# Patient Record
Sex: FEMALE | Race: WHITE | NOT HISPANIC OR LATINO | Employment: FULL TIME
[De-identification: names, ages, dates, MRNs, and addresses within clinical notes are randomized per-mention and may not be internally consistent; named-entity substitution may affect disease eponyms.]

---

## 2019-06-23 ENCOUNTER — RECORDS - HEALTHEAST (OUTPATIENT)
Dept: ADMINISTRATIVE | Facility: OTHER | Age: 49
End: 2019-06-23

## 2021-05-25 ENCOUNTER — RECORDS - HEALTHEAST (OUTPATIENT)
Dept: ADMINISTRATIVE | Facility: CLINIC | Age: 51
End: 2021-05-25

## 2021-06-16 PROBLEM — K59.00 CONSTIPATION: Status: ACTIVE | Noted: 2019-06-24

## 2023-06-10 ENCOUNTER — HOSPITAL ENCOUNTER (OUTPATIENT)
Facility: CLINIC | Age: 53
Setting detail: OBSERVATION
Discharge: HOME | End: 2023-06-13
Attending: EMERGENCY MEDICINE | Admitting: EMERGENCY MEDICINE
Payer: COMMERCIAL

## 2023-06-10 ENCOUNTER — APPOINTMENT (OUTPATIENT)
Dept: CT IMAGING | Facility: CLINIC | Age: 53
End: 2023-06-10
Attending: EMERGENCY MEDICINE
Payer: COMMERCIAL

## 2023-06-10 DIAGNOSIS — K59.00 CONSTIPATION, UNSPECIFIED CONSTIPATION TYPE: ICD-10-CM

## 2023-06-10 DIAGNOSIS — K52.9 COLITIS: ICD-10-CM

## 2023-06-10 LAB
ALBUMIN SERPL-MCNC: 3.9 G/DL (ref 3.5–5)
ALBUMIN UR-MCNC: NEGATIVE MG/DL
ALP SERPL-CCNC: 64 U/L (ref 45–120)
ALT SERPL W P-5'-P-CCNC: 16 U/L (ref 0–45)
ANION GAP SERPL CALCULATED.3IONS-SCNC: 8 MMOL/L (ref 5–18)
APPEARANCE UR: CLEAR
AST SERPL W P-5'-P-CCNC: 18 U/L (ref 0–40)
BASOPHILS # BLD AUTO: 0.1 10E3/UL (ref 0–0.2)
BASOPHILS NFR BLD AUTO: 1 %
BILIRUB SERPL-MCNC: 1 MG/DL (ref 0–1)
BILIRUB UR QL STRIP: NEGATIVE
BUN SERPL-MCNC: 12 MG/DL (ref 8–22)
CALCIUM SERPL-MCNC: 8.9 MG/DL (ref 8.5–10.5)
CHLORIDE BLD-SCNC: 102 MMOL/L (ref 98–107)
CO2 SERPL-SCNC: 27 MMOL/L (ref 22–31)
COLOR UR AUTO: ABNORMAL
CREAT SERPL-MCNC: 0.85 MG/DL (ref 0.6–1.1)
EOSINOPHIL # BLD AUTO: 0.1 10E3/UL (ref 0–0.7)
EOSINOPHIL NFR BLD AUTO: 1 %
ERYTHROCYTE [DISTWIDTH] IN BLOOD BY AUTOMATED COUNT: 12.2 % (ref 10–15)
GFR SERPL CREATININE-BSD FRML MDRD: 81 ML/MIN/1.73M2
GLUCOSE BLD-MCNC: 97 MG/DL (ref 70–125)
GLUCOSE UR STRIP-MCNC: NEGATIVE MG/DL
HCG SERPL QL: NEGATIVE
HCT VFR BLD AUTO: 40.6 % (ref 35–47)
HGB BLD-MCNC: 14 G/DL (ref 11.7–15.7)
HGB UR QL STRIP: NEGATIVE
IMM GRANULOCYTES # BLD: 0 10E3/UL
IMM GRANULOCYTES NFR BLD: 0 %
KETONES UR STRIP-MCNC: 40 MG/DL
LACTATE SERPL-SCNC: 0.7 MMOL/L (ref 0.7–2)
LEUKOCYTE ESTERASE UR QL STRIP: ABNORMAL
LIPASE SERPL-CCNC: 18 U/L (ref 0–52)
LYMPHOCYTES # BLD AUTO: 1.4 10E3/UL (ref 0.8–5.3)
LYMPHOCYTES NFR BLD AUTO: 13 %
MCH RBC QN AUTO: 33.3 PG (ref 26.5–33)
MCHC RBC AUTO-ENTMCNC: 34.5 G/DL (ref 31.5–36.5)
MCV RBC AUTO: 97 FL (ref 78–100)
MONOCYTES # BLD AUTO: 0.7 10E3/UL (ref 0–1.3)
MONOCYTES NFR BLD AUTO: 7 %
MUCOUS THREADS #/AREA URNS LPF: PRESENT /LPF
NEUTROPHILS # BLD AUTO: 8.3 10E3/UL (ref 1.6–8.3)
NEUTROPHILS NFR BLD AUTO: 78 %
NITRATE UR QL: NEGATIVE
NRBC # BLD AUTO: 0 10E3/UL
NRBC BLD AUTO-RTO: 0 /100
PH UR STRIP: 6.5 [PH] (ref 5–7)
PLATELET # BLD AUTO: 213 10E3/UL (ref 150–450)
POTASSIUM BLD-SCNC: 4.3 MMOL/L (ref 3.5–5)
PROT SERPL-MCNC: 6.6 G/DL (ref 6–8)
RBC # BLD AUTO: 4.2 10E6/UL (ref 3.8–5.2)
RBC URINE: 0 /HPF
SODIUM SERPL-SCNC: 137 MMOL/L (ref 136–145)
SP GR UR STRIP: 1.01 (ref 1–1.03)
SQUAMOUS EPITHELIAL: 1 /HPF
UROBILINOGEN UR STRIP-MCNC: <2 MG/DL
WBC # BLD AUTO: 10.6 10E3/UL (ref 4–11)
WBC URINE: 9 /HPF

## 2023-06-10 PROCEDURE — 96374 THER/PROPH/DIAG INJ IV PUSH: CPT

## 2023-06-10 PROCEDURE — 250N000011 HC RX IP 250 OP 636: Performed by: EMERGENCY MEDICINE

## 2023-06-10 PROCEDURE — 99285 EMERGENCY DEPT VISIT HI MDM: CPT | Mod: 25

## 2023-06-10 PROCEDURE — 83690 ASSAY OF LIPASE: CPT | Performed by: EMERGENCY MEDICINE

## 2023-06-10 PROCEDURE — 74177 CT ABD & PELVIS W/CONTRAST: CPT

## 2023-06-10 PROCEDURE — 85025 COMPLETE CBC W/AUTO DIFF WBC: CPT | Performed by: EMERGENCY MEDICINE

## 2023-06-10 PROCEDURE — 83605 ASSAY OF LACTIC ACID: CPT | Performed by: EMERGENCY MEDICINE

## 2023-06-10 PROCEDURE — 80053 COMPREHEN METABOLIC PANEL: CPT | Performed by: EMERGENCY MEDICINE

## 2023-06-10 PROCEDURE — 96376 TX/PRO/DX INJ SAME DRUG ADON: CPT

## 2023-06-10 PROCEDURE — 84703 CHORIONIC GONADOTROPIN ASSAY: CPT | Performed by: EMERGENCY MEDICINE

## 2023-06-10 PROCEDURE — 96361 HYDRATE IV INFUSION ADD-ON: CPT

## 2023-06-10 PROCEDURE — 96375 TX/PRO/DX INJ NEW DRUG ADDON: CPT

## 2023-06-10 PROCEDURE — 36415 COLL VENOUS BLD VENIPUNCTURE: CPT | Performed by: EMERGENCY MEDICINE

## 2023-06-10 PROCEDURE — 258N000003 HC RX IP 258 OP 636: Performed by: EMERGENCY MEDICINE

## 2023-06-10 PROCEDURE — 81001 URINALYSIS AUTO W/SCOPE: CPT | Performed by: EMERGENCY MEDICINE

## 2023-06-10 RX ORDER — SODIUM CHLORIDE 9 MG/ML
INJECTION, SOLUTION INTRAVENOUS CONTINUOUS
Status: DISCONTINUED | OUTPATIENT
Start: 2023-06-10 | End: 2023-06-12

## 2023-06-10 RX ORDER — FLUOXETINE 40 MG/1
40 CAPSULE ORAL DAILY
COMMUNITY

## 2023-06-10 RX ORDER — SODIUM CHLORIDE 9 MG/ML
INJECTION, SOLUTION INTRAVENOUS CONTINUOUS
Status: DISCONTINUED | OUTPATIENT
Start: 2023-06-10 | End: 2023-06-13 | Stop reason: HOSPADM

## 2023-06-10 RX ORDER — POLYETHYLENE GLYCOL 3350 17 G/17G
1 POWDER, FOR SOLUTION ORAL DAILY PRN
Status: ON HOLD | COMMUNITY
End: 2023-06-13

## 2023-06-10 RX ORDER — ONDANSETRON 2 MG/ML
4 INJECTION INTRAMUSCULAR; INTRAVENOUS ONCE
Status: COMPLETED | OUTPATIENT
Start: 2023-06-10 | End: 2023-06-10

## 2023-06-10 RX ORDER — HYDROMORPHONE HYDROCHLORIDE 1 MG/ML
0.5 INJECTION, SOLUTION INTRAMUSCULAR; INTRAVENOUS; SUBCUTANEOUS ONCE
Status: COMPLETED | OUTPATIENT
Start: 2023-06-10 | End: 2023-06-10

## 2023-06-10 RX ORDER — IOPAMIDOL 755 MG/ML
100 INJECTION, SOLUTION INTRAVASCULAR ONCE
Status: COMPLETED | OUTPATIENT
Start: 2023-06-10 | End: 2023-06-10

## 2023-06-10 RX ORDER — ONDANSETRON 8 MG/1
8 TABLET, ORALLY DISINTEGRATING ORAL 3 TIMES DAILY PRN
COMMUNITY
Start: 2023-03-10

## 2023-06-10 RX ORDER — KETOROLAC TROMETHAMINE 15 MG/ML
15 INJECTION, SOLUTION INTRAMUSCULAR; INTRAVENOUS ONCE
Status: COMPLETED | OUTPATIENT
Start: 2023-06-10 | End: 2023-06-10

## 2023-06-10 RX ADMIN — IOPAMIDOL 100 ML: 755 INJECTION, SOLUTION INTRAVENOUS at 17:59

## 2023-06-10 RX ADMIN — KETOROLAC TROMETHAMINE 15 MG: 15 INJECTION, SOLUTION INTRAMUSCULAR; INTRAVENOUS at 17:42

## 2023-06-10 RX ADMIN — HYDROMORPHONE HYDROCHLORIDE 0.5 MG: 1 INJECTION, SOLUTION INTRAMUSCULAR; INTRAVENOUS; SUBCUTANEOUS at 19:42

## 2023-06-10 RX ADMIN — SODIUM CHLORIDE 1000 ML: 9 INJECTION, SOLUTION INTRAVENOUS at 17:43

## 2023-06-10 RX ADMIN — ONDANSETRON 4 MG: 2 INJECTION INTRAMUSCULAR; INTRAVENOUS at 20:29

## 2023-06-10 RX ADMIN — ONDANSETRON 4 MG: 2 INJECTION INTRAMUSCULAR; INTRAVENOUS at 17:42

## 2023-06-10 RX ADMIN — SODIUM CHLORIDE: 9 INJECTION, SOLUTION INTRAVENOUS at 22:49

## 2023-06-10 RX ADMIN — HYDROMORPHONE HYDROCHLORIDE 0.5 MG: 1 INJECTION, SOLUTION INTRAMUSCULAR; INTRAVENOUS; SUBCUTANEOUS at 22:48

## 2023-06-10 ASSESSMENT — ACTIVITIES OF DAILY LIVING (ADL)
ADLS_ACUITY_SCORE: 35
ADLS_ACUITY_SCORE: 33
ADLS_ACUITY_SCORE: 35
ADLS_ACUITY_SCORE: 35

## 2023-06-10 NOTE — ED PROVIDER NOTES
EMERGENCY DEPARTMENT ENCOUNTER      NAME: Yumi Cutler  AGE: 52 year old female  YOB: 1970  MRN: [id number]  EVALUATION DATE & TIME: 6/10/2023  4:37 PM    PCP: French Leong    ED PROVIDER: Whitney Martin MD      Chief Complaint   Patient presents with     Constipation         FINAL IMPRESSION:  1. Constipation, unspecified constipation type    2. Colitis          ED COURSE & MEDICAL DECISION MAKING:    Pertinent Labs & Imaging studies reviewed. (See chart for details)    5:20 PM I introduced myself to the patient, obtained patient history, performed a physical exam, and discussed plan for ED workup including potential diagnostic laboratory/imaging studies and interventions.    52 year old female presents to the Emergency Department for evaluation of abdominal pain and constipation.  Patient presents with ongoing issues with constipation for 2 weeks.  Was just seen at an outside hospital 2 days ago and had CT scan at that time that had showed colitis and constipation.  They attempted an enema and disimpaction without much success.  Patient was sent home and states she is continued to have worsening pain and has tried mag citrate, MiraLAX without improvement.  She is also having vomiting.  On exam she has diffuse abdominal tenderness worse in the left lower quadrant.  With the colitis that was previously there I do a concern for the potential of stercoral colitis and possible perforation as a result of her worsening symptoms and thus do feel she warrants repeat CT scan with IV contrast.  IV Zofran was given here and she was also given IV fluids as well as IV Toradol for pain control.  Had attempted to avoid opiates for pain control due to the constipation issues but she continued to have discomfort and thus she did get a dose of IV Dilaudid and repeat dose of IV Zofran.  Laboratory studies reveal white blood cell count 10.6 with a hemoglobin of 14.  Lactic acid within normal limits.  Also  had concern for potential bowel obstruction.  Lipase within normal limits.  Pregnancy test negative.    CT does not reveal any evidence of bowel obstruction.  It shows constipation that is slightly improved but again segmental colitis involving the mid and lower descending colon as well as the upper sigmoid colon was noted.  Do have concern this could potentially be developing stercoral colitis.  Patient has been trying multiple conservative therapies including having an enema 2 days ago which did not provide much result.  She has had history of severe constipation about 8 years ago that required admission and consultation with GI.  Does have history of breast cancer as well previously.  Do have concern for the cause of the significant constipation including potential underlying mass and the development of stercoral colitis with the risk of perforation and thus do feel she warrants admission for further management and attempted bowel cleanout.  Had offered her an enema here which she wanted to wait on at this point.  We did discuss potential transfer to [facility] as the patient lives close to this hospital.  Thus we contacted the [facility] hospitalist Dr. Ennis who declined the patient for admission.  Patient was comfortable staying here at [facility] and I spoke with the medicine team here who excepted the patient for admission.  She was admitted in stable condition.         At the conclusion of the encounter I discussed the results of all of the tests and the disposition. The questions were answered. The patient or family acknowledged understanding and was agreeable with the care plan.       Medical Decision Making    History:    Supplemental history from: Documented in chart, if applicable    External Record(s) reviewed: Documented in chart, if applicable.    Work Up:    Chart documentation includes differential considered and any EKGs or imaging independently interpreted by provider.    In additional to work  up documented, I considered the following work up: Documented in chart, if applicable.    External consultation:    Discussion of management with another provider: Documented in chart, if applicable    Complicating factors:    Care impacted by chronic illness: Cancer/Chemotherapy and Mental Health    Care affected by social determinants of health: N/A    Disposition considerations: Admit.      MEDICATIONS GIVEN IN THE EMERGENCY:  Medications   ketorolac (TORADOL) injection 30 mg (30 mg Intravenous $Given 6/11/23 2024)   0.9% sodium chloride BOLUS (0 mLs Intravenous Stopped 6/10/23 2005)   ketorolac (TORADOL) injection 15 mg (15 mg Intravenous $Given 6/10/23 1742)   ondansetron (ZOFRAN) injection 4 mg (4 mg Intravenous $Given 6/10/23 1742)   iopamidol (ISOVUE-370) solution 100 mL (100 mLs Intravenous $Given 6/10/23 1759)   HYDROmorphone (PF) (DILAUDID) injection 0.5 mg (0.5 mg Intravenous $Given 6/10/23 1942)   ondansetron (ZOFRAN) injection 4 mg (4 mg Intravenous $Given 6/10/23 2029)   HYDROmorphone (PF) (DILAUDID) injection 0.5 mg (0.5 mg Intravenous $Given 6/10/23 2248)   Enema Compound (docusate/mineral oil/NaPhos) NO MAG CIT PREMIX (226 mLs Rectal Not Given 6/10/23 2249)   Enema Compound (docusate/mineral oil/NaPhos) NO MAG CIT PREMIX (226 mLs Rectal $Given 6/11/23 1723)   polyethylene glycol (GoLYTELY) suspension 4,000 mL (4,000 mLs Oral $Given 6/11/23 1856)   diatrizoate meglumine-sodium (GASTROGRAFIN/GASTROVIEW) 66-10 % solution 720 mL (720 mLs Rectal $Given 6/12/23 1602)   ketorolac (TORADOL) injection 15 mg (15 mg Intravenous $Given 6/12/23 1859)   diphenhydrAMINE (BENADRYL) injection 25 mg (25 mg Intravenous $Given 6/13/23 0854)       NEW PRESCRIPTIONS STARTED AT TODAY'S ER VISIT         =================================================================    HPI    Patient information was obtained from: Patient     Use of : N/A         Yumi ABERNATHY He is a 52 year old female with a pertinent medical  history of previous breast cancer s/p bilateral mastectomy, appendectomy, cholecystectomy who presents to this ED for evaluation of abdominal pain and constipation.    Per Cart Review, patient was seen at the [facility] on [facility]/08/23 for evaluation of abdominal pain, constipation, and vomiting. CT was negative for a bowel obstruction, but it did show large amount of stool, and there was also mild colitis that was nonspecific. All labs deemed essentially unremarkable. She was given IV fluids and Zofran. She was given an enema she had some results, but not a lot. Rectal exam was performed and there was no fecal impaction that could be disimpacted. Patient was discharged with prescriptions of Zofran and Hydroxyzine.     Patient endorses the history above. She reports that for approximately 2 weeks she has been constipated and has not had a bowel movement. She states that if she does pass any stool it is characterized as very minimal flakes. She notes the last bowel movement she had was this morning at 10:00 AM, which was the very minimal flakes. She denies passing any gas recently as well. She endorses taking Miralax since yesterday for her symptoms, but she denies it providing any relief. She denies taking any suppositories. She reports she also tried mag citrate without improvement. She did not feel that the enema 2 days ago gave her any relief. She denies any recent blood in stool or melena. She endorses associated abdominal pain and lower back pain, both of which have mildly worsened since her ED visit on 06/08/23. She additionally endorses associated nausea and vomiting, with her last episode of vomiting being this morning. She denies any recent hematemesis. She endorses taking Zofran prior to heading to the ED today, and she notes this has provided mild relief of her nausea. She denies taking any Tylenol or Ibuprofen for her symptoms She endorses a PMH of constipation and notes that she developed  similar symptoms approximately 8 years ago which caused her to be hospitalized and required a specialist in order to be resolved. She denies knowing the cause of this past constipation, and she notes that a colonoscopy done 8 years ago in association with this past episode was unremarkable. She denies any known pertinent medical history outside of an appendectomy and cholecystectomy. She endorses regularly taking Prozac and Adderall. She also endorses increased stress recently, but she denies any recent fever, cough, chest pain, shortness of breath, urinary issues, flank pain, or any other complications at this time.       REVIEW OF SYSTEMS   Review of Systems   Pertinent positives and negatives are documented in the HPI. All other systems reviewed and are negative.      PAST MEDICAL HISTORY:  Past Medical History:   Diagnosis Date     ADHD (attention deficit hyperactivity disorder)      Anxiety      Breast cancer (H)      Depression        PAST SURGICAL HISTORY:  Past Surgical History:   Procedure Laterality Date     APPENDECTOMY       CHOLECYSTECTOMY       MASTECTOMY Bilateral            CURRENT MEDICATIONS:    acyclovir (ZOVIRAX) 400 MG tablet  ALPRAZolam (XANAX) 0.5 MG tablet  buPROPion (WELLBUTRIN XL) 300 MG 24 hr tablet  dextroamphetamine-amphetamine (ADDERALL XR) 30 MG 24 hr capsule  FLUoxetine (PROZAC) 40 MG capsule  linaclotide (LINZESS) 145 MCG capsule  omeprazole (PRILOSEC) 20 MG DR capsule  ondansetron (ZOFRAN ODT) 8 MG ODT tab  polyethylene glycol (GOLYTELY) 236 g suspension  polyethylene glycol (MIRALAX) 17 GM/Dose powder        ALLERGIES:  Allergies   Allergen Reactions     Penicillins Itching       FAMILY HISTORY:  No family history on file.    SOCIAL HISTORY:   Social History     Socioeconomic History     Marital status: Single   Tobacco Use     Smoking status: Never     Smokeless tobacco: Never   Substance and Sexual Activity     Alcohol use: Yes     Comment: Alcoholic Drinks/day: Occasional      "Drug use: Never       VITALS:  /74 (BP Location: Left arm)   Pulse 70   Temp 98.5  F (36.9  C) (Oral)   Resp 18   Ht 1.676 m (5' 5.98\")   Wt 70.3 kg (155 lb)   SpO2 97%   BMI 25.03 kg/m      PHYSICAL EXAM    Physical Exam  Constitutional: Well developed, Well nourished, NAD, GCS 15  HENT: Normocephalic, Atraumatic, Bilateral external ears normal, Oropharynx normal, mucous membranes moist, Nose normal. Neck-Normal range of motion, No tenderness, Supple, No stridor.  Eyes: PERRL, EOMI, Conjunctiva normal, No discharge.   Respiratory: Normal breath sounds, No respiratory distress, No wheezing or crackles, Speaks in full sentences easily.   Cardiovascular: Normal heart rate, Regular rhythm, No murmurs, No rubs, No gallops. 2+ radial pulses bilaterally  GI: Bowel sounds normal, Soft, Generalized abdominal tenderness worst in the LLQ, tenderness, No masses, No rebound or guarding. No CVA tenderness bilaterally.   Musculoskeletal: 2+ DP pulses. No notable lower extremity edema. Good range of motion in all major joints. No tenderness to palpation or major deformities noted.    Integument: Warm, Dry, No erythema, No rash. No petechiae.   Neurologic: Alert & oriented x 3, 5/5 strength in all 4 extremities bilaterally. Sensation intact to light touch in all 4 extremities and the face bilaterally. No focal deficits noted.   Psychiatric: Affect normal, Judgment normal, Mood normal. Cooperative.      LAB:  All pertinent labs reviewed and interpreted.  Results for orders placed or performed during the hospital encounter of 06/10/23   CT Abdomen Pelvis w Contrast    Impression    IMPRESSION:   1.  Constipation slightly improved. Again seen is segmental colitis involving the mid and lower descending colon as well as the upper sigmoid colon.    2.  Bilateral Essure fallopian tube coils.    3.  Cholecystectomy.   XR Colon Water Soluble Diagnostic    Impression    IMPRESSION:  1.  Moderate amount stool.  2.  No colonic " abnormality seen.   Comprehensive metabolic panel   Result Value Ref Range    Sodium 137 136 - 145 mmol/L    Potassium 4.3 3.5 - 5.0 mmol/L    Chloride 102 98 - 107 mmol/L    Carbon Dioxide (CO2) 27 22 - 31 mmol/L    Anion Gap 8 5 - 18 mmol/L    Urea Nitrogen 12 8 - 22 mg/dL    Creatinine 0.85 0.60 - 1.10 mg/dL    Calcium 8.9 8.5 - 10.5 mg/dL    Glucose 97 70 - 125 mg/dL    Alkaline Phosphatase 64 45 - 120 U/L    AST 18 0 - 40 U/L    ALT 16 0 - 45 U/L    Protein Total 6.6 6.0 - 8.0 g/dL    Albumin 3.9 3.5 - 5.0 g/dL    Bilirubin Total 1.0 0.0 - 1.0 mg/dL    GFR Estimate 81 >60 mL/min/1.73m2   Lactic acid whole blood   Result Value Ref Range    Lactic Acid 0.7 0.7 - 2.0 mmol/L   Result Value Ref Range    Lipase 18 0 - 52 U/L   UA with Microscopic reflex to Culture    Specimen: Urine, Midstream   Result Value Ref Range    Color Urine Light Yellow Colorless, Straw, Light Yellow, Yellow    Appearance Urine Clear Clear    Glucose Urine Negative Negative mg/dL    Bilirubin Urine Negative Negative    Ketones Urine 40 (A) Negative mg/dL    Specific Gravity Urine 1.010 1.003 - 1.035    Blood Urine Negative Negative    pH Urine 6.5 5.0 - 7.0    Protein Albumin Urine Negative Negative mg/dL    Urobilinogen Urine <2.0 <2.0 mg/dL    Nitrite Urine Negative Negative    Leukocyte Esterase Urine 75 Ketan/uL (A) Negative    Mucus Urine Present (A) None Seen /LPF    RBC Urine 0 <=2 /HPF    WBC Urine 9 (H) <=5 /HPF    Squamous Epithelials Urine 1 <=1 /HPF   HCG qualitative Blood   Result Value Ref Range    hCG Serum Qualitative Negative Negative   CBC with platelets and differential   Result Value Ref Range    WBC Count 10.6 4.0 - 11.0 10e3/uL    RBC Count 4.20 3.80 - 5.20 10e6/uL    Hemoglobin 14.0 11.7 - 15.7 g/dL    Hematocrit 40.6 35.0 - 47.0 %    MCV 97 78 - 100 fL    MCH 33.3 (H) 26.5 - 33.0 pg    MCHC 34.5 31.5 - 36.5 g/dL    RDW 12.2 10.0 - 15.0 %    Platelet Count 213 150 - 450 10e3/uL    % Neutrophils 78 %    % Lymphocytes 13 %     % Monocytes 7 %    % Eosinophils 1 %    % Basophils 1 %    % Immature Granulocytes 0 %    NRBCs per 100 WBC 0 <1 /100    Absolute Neutrophils 8.3 1.6 - 8.3 10e3/uL    Absolute Lymphocytes 1.4 0.8 - 5.3 10e3/uL    Absolute Monocytes 0.7 0.0 - 1.3 10e3/uL    Absolute Eosinophils 0.1 0.0 - 0.7 10e3/uL    Absolute Basophils 0.1 0.0 - 0.2 10e3/uL    Absolute Immature Granulocytes 0.0 <=0.4 10e3/uL    Absolute NRBCs 0.0 10e3/uL   Creatinine   Result Value Ref Range    Creatinine 0.81 0.60 - 1.10 mg/dL    GFR Estimate 86 >60 mL/min/1.73m2   Result Value Ref Range    Immunoglobulin A 104 84 - 499 mg/dL   Tissue transglutaminase rick IgA and IgG   Result Value Ref Range    Tissue Transglutaminase Antibody IgA <0.2 <7.0 U/mL    Tissue Transglutaminase Antibody IgG <0.6 <7.0 U/mL   TSH with free T4 reflex   Result Value Ref Range    TSH 1.20 0.30 - 5.00 uIU/mL       RADIOLOGY:  Reviewed all pertinent imaging. Please see official radiology report.  XR Colon Water Soluble Diagnostic   Final Result   IMPRESSION:   1.  Moderate amount stool.   2.  No colonic abnormality seen.      CT Abdomen Pelvis w Contrast   Final Result   IMPRESSION:    1.  Constipation slightly improved. Again seen is segmental colitis involving the mid and lower descending colon as well as the upper sigmoid colon.      2.  Bilateral Essure fallopian tube coils.      3.  Cholecystectomy.        PROCEDURES:   None.       [facility] Documentation:   CMS Diagnoses:               I, Domingo Mcnulty, am serving as a scribe to document services personally performed by Whitney Martin MD based on my observation and the provider's statements to me. I, Whitney Martin MD, attest that Domingo Mcnulty is acting in a scribe capacity, has observed my performance of the services and has documented them in accordance with my direction.    Whitney Martin MD  [facility] EMERGENCY ROOM  [address]  [id number]  [phone]       Whitney Martin MD  06/21/23 1501

## 2023-06-10 NOTE — ED TRIAGE NOTES
Constipation 12 days.  Seen at [facility] Thursday.  Did CT and no obstruction on Thursday.  Had enemas at hospital.  Given zofran prescription but continues to have nausea.     Triage Assessment     Row Name 06/10/23 2139       Triage Assessment (Adult)    Airway WDL WDL       Respiratory WDL    Respiratory WDL WDL       Skin Circulation/Temperature WDL    Skin Circulation/Temperature WDL WDL       Cardiac WDL    Cardiac WDL WDL       Peripheral/Neurovascular WDL    Peripheral Neurovascular WDL WDL       Cognitive/Neuro/Behavioral WDL    Cognitive/Neuro/Behavioral WDL WDL

## 2023-06-11 LAB
CREAT SERPL-MCNC: 0.81 MG/DL (ref 0.6–1.1)
GFR SERPL CREATININE-BSD FRML MDRD: 86 ML/MIN/1.73M2

## 2023-06-11 PROCEDURE — G0378 HOSPITAL OBSERVATION PER HR: HCPCS

## 2023-06-11 PROCEDURE — 96376 TX/PRO/DX INJ SAME DRUG ADON: CPT

## 2023-06-11 PROCEDURE — 250N000013 HC RX MED GY IP 250 OP 250 PS 637: Performed by: STUDENT IN AN ORGANIZED HEALTH CARE EDUCATION/TRAINING PROGRAM

## 2023-06-11 PROCEDURE — 96372 THER/PROPH/DIAG INJ SC/IM: CPT

## 2023-06-11 PROCEDURE — 258N000003 HC RX IP 258 OP 636: Performed by: EMERGENCY MEDICINE

## 2023-06-11 PROCEDURE — 96361 HYDRATE IV INFUSION ADD-ON: CPT

## 2023-06-11 PROCEDURE — 99222 1ST HOSP IP/OBS MODERATE 55: CPT | Mod: GC

## 2023-06-11 PROCEDURE — 36415 COLL VENOUS BLD VENIPUNCTURE: CPT

## 2023-06-11 PROCEDURE — 250N000013 HC RX MED GY IP 250 OP 250 PS 637

## 2023-06-11 PROCEDURE — 250N000011 HC RX IP 250 OP 636

## 2023-06-11 PROCEDURE — 82565 ASSAY OF CREATININE: CPT

## 2023-06-11 RX ORDER — ENOXAPARIN SODIUM 100 MG/ML
40 INJECTION SUBCUTANEOUS EVERY 24 HOURS
Status: DISCONTINUED | OUTPATIENT
Start: 2023-06-11 | End: 2023-06-13 | Stop reason: HOSPADM

## 2023-06-11 RX ORDER — HYDROXYZINE HYDROCHLORIDE 25 MG/1
25 TABLET, FILM COATED ORAL EVERY 6 HOURS PRN
Status: DISCONTINUED | OUTPATIENT
Start: 2023-06-11 | End: 2023-06-13 | Stop reason: HOSPADM

## 2023-06-11 RX ORDER — KETOROLAC TROMETHAMINE 30 MG/ML
30 INJECTION, SOLUTION INTRAMUSCULAR; INTRAVENOUS EVERY 6 HOURS PRN
Status: DISPENSED | OUTPATIENT
Start: 2023-06-11 | End: 2023-06-12

## 2023-06-11 RX ORDER — ONDANSETRON 2 MG/ML
4 INJECTION INTRAMUSCULAR; INTRAVENOUS EVERY 6 HOURS PRN
Status: DISCONTINUED | OUTPATIENT
Start: 2023-06-11 | End: 2023-06-13 | Stop reason: HOSPADM

## 2023-06-11 RX ORDER — ONDANSETRON 4 MG/1
4 TABLET, ORALLY DISINTEGRATING ORAL EVERY 6 HOURS PRN
Status: DISCONTINUED | OUTPATIENT
Start: 2023-06-11 | End: 2023-06-13 | Stop reason: HOSPADM

## 2023-06-11 RX ORDER — ACETAMINOPHEN 325 MG/1
650 TABLET ORAL EVERY 6 HOURS PRN
Status: DISCONTINUED | OUTPATIENT
Start: 2023-06-11 | End: 2023-06-13 | Stop reason: HOSPADM

## 2023-06-11 RX ORDER — PANTOPRAZOLE SODIUM 40 MG/1
40 TABLET, DELAYED RELEASE ORAL DAILY
Status: DISCONTINUED | OUTPATIENT
Start: 2023-06-11 | End: 2023-06-13 | Stop reason: HOSPADM

## 2023-06-11 RX ORDER — ACETAMINOPHEN 650 MG/1
650 SUPPOSITORY RECTAL EVERY 6 HOURS PRN
Status: DISCONTINUED | OUTPATIENT
Start: 2023-06-11 | End: 2023-06-13 | Stop reason: HOSPADM

## 2023-06-11 RX ORDER — DEXTROAMPHETAMINE SACCHARATE, AMPHETAMINE ASPARTATE MONOHYDRATE, DEXTROAMPHETAMINE SULFATE AND AMPHETAMINE SULFATE 2.5; 2.5; 2.5; 2.5 MG/1; MG/1; MG/1; MG/1
30 CAPSULE, EXTENDED RELEASE ORAL EVERY MORNING
Status: DISCONTINUED | OUTPATIENT
Start: 2023-06-12 | End: 2023-06-13 | Stop reason: HOSPADM

## 2023-06-11 RX ORDER — LIDOCAINE 40 MG/G
CREAM TOPICAL
Status: DISCONTINUED | OUTPATIENT
Start: 2023-06-11 | End: 2023-06-13 | Stop reason: HOSPADM

## 2023-06-11 RX ORDER — HYDROXYZINE HYDROCHLORIDE 25 MG/1
50 TABLET, FILM COATED ORAL EVERY 6 HOURS PRN
Status: DISCONTINUED | OUTPATIENT
Start: 2023-06-11 | End: 2023-06-13 | Stop reason: HOSPADM

## 2023-06-11 RX ORDER — PROCHLORPERAZINE MALEATE 10 MG
10 TABLET ORAL EVERY 6 HOURS PRN
Status: DISCONTINUED | OUTPATIENT
Start: 2023-06-11 | End: 2023-06-13 | Stop reason: HOSPADM

## 2023-06-11 RX ORDER — PROCHLORPERAZINE 25 MG
25 SUPPOSITORY, RECTAL RECTAL EVERY 12 HOURS PRN
Status: DISCONTINUED | OUTPATIENT
Start: 2023-06-11 | End: 2023-06-13 | Stop reason: HOSPADM

## 2023-06-11 RX ORDER — BUPROPION HYDROCHLORIDE 150 MG/1
300 TABLET ORAL DAILY
Status: DISCONTINUED | OUTPATIENT
Start: 2023-06-11 | End: 2023-06-13 | Stop reason: HOSPADM

## 2023-06-11 RX ADMIN — HYDROXYZINE HYDROCHLORIDE 25 MG: 25 TABLET ORAL at 02:36

## 2023-06-11 RX ADMIN — ENOXAPARIN SODIUM 40 MG: 100 INJECTION SUBCUTANEOUS at 08:26

## 2023-06-11 RX ADMIN — POLYETHYLENE GLYCOL 3350, SODIUM SULFATE ANHYDROUS, SODIUM BICARBONATE, SODIUM CHLORIDE, POTASSIUM CHLORIDE 4000 ML: 236; 22.74; 6.74; 5.86; 2.97 POWDER, FOR SOLUTION ORAL at 18:56

## 2023-06-11 RX ADMIN — Medication 226 ML: at 17:23

## 2023-06-11 RX ADMIN — Medication 1 MG: at 22:08

## 2023-06-11 RX ADMIN — KETOROLAC TROMETHAMINE 30 MG: 30 INJECTION, SOLUTION INTRAMUSCULAR; INTRAVENOUS at 20:24

## 2023-06-11 RX ADMIN — KETOROLAC TROMETHAMINE 30 MG: 30 INJECTION, SOLUTION INTRAMUSCULAR; INTRAVENOUS at 02:42

## 2023-06-11 RX ADMIN — SODIUM CHLORIDE: 9 INJECTION, SOLUTION INTRAVENOUS at 17:23

## 2023-06-11 RX ADMIN — KETOROLAC TROMETHAMINE 30 MG: 30 INJECTION, SOLUTION INTRAMUSCULAR; INTRAVENOUS at 09:04

## 2023-06-11 ASSESSMENT — ACTIVITIES OF DAILY LIVING (ADL)
ADLS_ACUITY_SCORE: 35
ADLS_ACUITY_SCORE: 21
ADLS_ACUITY_SCORE: 35
ADLS_ACUITY_SCORE: 21
ADLS_ACUITY_SCORE: 35

## 2023-06-11 NOTE — PROGRESS NOTES
"[facility]    Progress Note - Hospitalist Service       Date of Admission:  6/10/2023    Assessment & Plan   Yumi Cutler is a 52 year old female admitted on 6/10/2023. She has a history of breast cancer and is admitted for constipation.      Severe Constipation  Chronic colitis?  Presented to the ED reporting not having a solid bowel movement in 14 days.  Previously visited [facility] emergency department for similar symptoms on 6/8/2023.  Also reports a history of severe constipation both 4 and 8 years ago.  In 2019 she was admitted for the severe constipation which improved with Gastrografin enema, at that time it revealed a short segment of colon does not significantly distended which could be possibly related to a stricture and recommended colonoscopy for follow-up.  Patient reports she has not had a colonoscopy since 8 years ago which was \"all normal\".  At that time patient also had wall thickening which could have been inflammatory.  Segmental colitis seen on CT this visit.  Unsure recurrent cause of severe constipation however will likely recommend GI follow-up outpatient if patient is able to tolerate course and advance her diet.  -gastrografin enema ordered - given patient report that previous gastrografin enema resolved last constipation episode   -Zofran, compazine for nausea  -Tylenol, toradol for pain, hold off of opioid pain medications given constipation  -Consider GI consult if still not improving  -Recommend colonoscopy outpatient  -will continue to monitor and consider additional enemas and motility agents of gastrografin enema does not result in significant reduction of stool burden/symptoms relief      Depression  Anxiety  -PTA Bupropion, fluoxetine   -PTA alprazolam prn      ADHD  -PTA adderall           Diet: Regular Diet Adult    DVT Prophylaxis: Enoxaparin (Lovenox) SQ  Jackson Catheter: Not present  Fluids:  ml/hr  Lines: None     Cardiac Monitoring: " "None  Code Status: Full Code      Clinically Significant Risk Factors Present on Admission                       # Overweight: Estimated body mass index is 25.03 kg/m  as calculated from the following:    Height as of this encounter: 1.676 m (5' 5.98\").    Weight as of this encounter: 70.3 kg (155 lb).            Disposition Plan      Expected Discharge Date: 06/12/2023                The patient's care was discussed with the Attending Physician, Dr. Alexandra Vasquez.    Joshua Howard DO  Hospitalist Service  [facility]  Securely message with Blackberry (more info)  Text page via [facility] Paging/Directory   ______________________________________________________________________    Interval History   She reports that she still has not had a bowel movement following enema attempt.  She does report an improvement in pain in which she says her pain is now down to a 2 out of 10.  She denies any headaches, chills, fever, nausea, vomiting, shortness of breath, chest pain, dysuria.  States that she has tried magnesium citrate at home in the past and would not like to repeat this medication at this time due to discomfort she previously experienced.  Patient states that she does not really have an appetite but has been able to tolerate sips of water.  She reports no additional concerns at this time and hopes to have a bowel movement and return home as soon as possible.    Physical Exam   Vital Signs: Temp: 97.4  F (36.3  C) Temp src: Oral BP: 116/72 Pulse: 83   Resp: 20 SpO2: 98 % O2 Device: None (Room air)    Weight: 155 lbs 0 oz    General: alert, appears comfortable, no acute distress  HEENT: atraumatic, conjunctiva clear without erythema, EOM's intact, no nasal discharge  Neck: supple  Cardiac: RRR w/o audible murmur  Resp: bilateral clear lungs w/o wheezing, crackles or rhonchi; breathing comfortably on RA  Abdomen: Soft, mildly diffusely tender to palpation over abdomen slightly worse in left lower " quadrant compared to other abdominal quadrants, no masses. Normoactive bowel sounds present in all four quadrants  Extremities: no peripheral edema  Skin: no rashes or suspicious legions on exposed skin  Neuro: grossly normal CN; normal strength, sensation, & tone  Psych: affect congruent with mood    Medical Decision Making     Please see A&P for additional details of medical decision making.      Data     I have personally reviewed the following data over the past 24 hrs:    10.6  \   14.0   / 213     137 102 12 /  97   4.3 27 0.81 \       ALT: 16 AST: 18 AP: 64 TBILI: 1.0   ALB: 3.9 TOT PROTEIN: 6.6 LIPASE: 18       Procal: N/A CRP: N/A Lactic Acid: 0.7         Imaging results reviewed over the past 24 hrs:   Recent Results (from the past 24 hour(s))   CT Abdomen Pelvis w Contrast    Narrative    EXAM: CT ABDOMEN PELVIS W CONTRAST  LOCATION: [facility]  DATE/TIME: 6/10/2023 6:05 PM CDT    INDICATION: Worsening lower abdominal pain (worse in LLQ) lack of bowel movement, vomiting, eval for obstruction, perforation, stercoral colitis, diverticulitis, etc.  COMPARISON: 06/09/2023.  TECHNIQUE: CT scan of the abdomen and pelvis was performed following injection of IV contrast. Multiplanar reformats were obtained. Dose reduction techniques were used.  CONTRAST: 100 ml Isovue 370.    FINDINGS:   LOWER CHEST: Breast prostheses.    HEPATOBILIARY: Cholecystectomy. Focal fat medial segment left hepatic lobe.    PANCREAS: Normal.    SPLEEN: Normal.    ADRENAL GLANDS: Normal.    KIDNEYS/BLADDER: Normal.    BOWEL: Constipation which is slightly improved. Again seen is wall thickening beginning mid descending colon and also involving the upper sigmoid colon with pericolonic soft tissue stranding compatible with segmental colitis.    LYMPH NODES: Normal.    VASCULATURE: Unremarkable.    PELVIC ORGANS: Bilateral Essure coils in the fallopian tubes.    MUSCULOSKELETAL: Normal.      Impression     IMPRESSION:   1.  Constipation slightly improved. Again seen is segmental colitis involving the mid and lower descending colon as well as the upper sigmoid colon.    2.  Bilateral Essure fallopian tube coils.    3.  Cholecystectomy.

## 2023-06-11 NOTE — PHARMACY-ADMISSION MEDICATION HISTORY
Pharmacist Admission Medication History    Admission medication history is complete. The information provided in this note is only as accurate as the sources available at the time of the update.    Medication reconciliation/reorder completed by provider prior to medication history? Yes    Information Source(s): Patient and CareEverywhere/SureScripts via in-person    Pertinent Information:     Changes made to PTA medication list:    Added: omeprazole, ondansetron    Deleted: MOM, reglan, trazodone    Changed: alprazolam to prn, miralax to prn    Medication Affordability: no issue     Allergies reviewed with patient and updates made in EHR: yes    Medication History Completed By: Sharita Cisneros RPH 6/10/2023 9:19 PM    Prior to Admission medications    Medication Sig Last Dose Taking? Auth Provider Long Term End Date   acyclovir (ZOVIRAX) 400 MG tablet [ACYCLOVIR (ZOVIRAX) 400 MG TABLET] TAKE 1 TABLET BY MOUTH THREE TIMES DAILY AS NEEDED More than a month at unknown Yes Provider, Historical     ALPRAZolam (XANAX) 0.5 MG tablet Take 0.5 mg by mouth nightly as needed More than a month at unknown Yes Provider, Historical     buPROPion (WELLBUTRIN XL) 300 MG 24 hr tablet [BUPROPION (WELLBUTRIN XL) 300 MG 24 HR TABLET] Take 300 mg by mouth daily. 6/8/2023 at unknown Yes Provider, Historical     dextroamphetamine-amphetamine (ADDERALL XR) 30 MG 24 hr capsule [DEXTROAMPHETAMINE-AMPHETAMINE (ADDERALL XR) 30 MG 24 HR CAPSULE] Take 30 mg by mouth every morning. 6/8/2023 at unknown Yes Provider, Historical     FLUoxetine (PROZAC) 40 MG capsule Take 40 mg by mouth daily 6/8/2023 at unknown Yes Unknown, Entered By History     omeprazole (PRILOSEC) 20 MG DR capsule Take 20 mg by mouth daily 6/8/2023 at unknown Yes Unknown, Entered By History     ondansetron (ZOFRAN ODT) 8 MG ODT tab Take 8 mg by mouth 3 times daily as needed for nausea 6/8/2023 at unknown Yes Unknown, Entered By History     polyethylene glycol (MIRALAX) 17 g packet  Take 1 packet by mouth daily as needed for constipation 6/10/2023 at 1000 Yes Unknown, Entered By History       2

## 2023-06-11 NOTE — ED NOTES
Provider updated in regards to no radiologist here to complete XR colon diagnostic. States team will discuss.

## 2023-06-11 NOTE — PROGRESS NOTES
PRIMARY DIAGNOSIS: ACUTE PAIN  OUTPATIENT/OBSERVATION GOALS TO BE MET BEFORE DISCHARGE:  1. Pain Status: Pain controlled with IV Toradol.     2. Return to near baseline physical activity: Yes    3. Cleared for discharge by consultants (if involved): N/A    Discharge Planner Nurse   Safe discharge environment identified: Yes  Barriers to discharge: Yes , XR        Entered by: Alisha Abdi RN 06/11/2023 10:44 AM     Please review provider order for any additional goals.   Nurse to notify provider when observation goals have been met and patient is ready for discharge.

## 2023-06-11 NOTE — PROGRESS NOTES
Called pharmacy to ask if Mulu can be mixed with juice or other liquid besides water. Pharmacist states water must be used. Residents paged again for clarification on how much Mulu patient should be drinking as there is no guidance on order. Resident states that patient may however much she is able to tolerate per her comfort level. Information relayed to patient and Mulu now at bedside. Patient able to have one small bowel movement since arrival to [facility].

## 2023-06-11 NOTE — H&P
"    [facility]    History and Physical - Hospitalist Service       Date of Admission:  6/10/2023    Assessment & Plan      Yumi Cutler is a 52 year old female admitted on 6/10/2023. She has a history of breast cancer and is admitted for constipation.     Severe Constipation  Chronic colitis?  Presenting without solid bowel movement in 14 days.  Previously visited [facility] emergency department for similar symptoms on 6/8/2023.  Also reports a history of severe constipation both 4 and 8 years ago.  In 2019 she was admitted for the severe constipation which improved with Gastrografin enema, at that time it revealed a short segment of colon does not significantly distended which could be possibly related to a stricture and recommended colonoscopy for follow-up.  Patient reports she has not had a colonoscopy since 8 years ago which was \"all normal\".  At that time patient also had wall thickening which could have been inflammatory.  Segmental colitis seen on CT this visit.  Unsure recurrent cause of severe constipation however will likely recommend GI follow-up outpatient if patient is able to tolerate course and advance her diet.  -Soap suds enema  -Zofran, compazine for nausea  -Tylenol, toradol for pain, hold off of opioid pain medications given constipation  -Consider GI consult if still not improving   -Recommend colonoscopy outpatient    Depression  Anxiety  -PTA Bupropion, fluoxetine   -PTA alprazolam prn     ADHD  -PTA adderall       Diet: Advance Diet as Tolerated: Clear Liquid Diet  DVT Prophylaxis: Enoxaparin (Lovenox) SQ  Jackson Catheter: Not present  Fluids: PO  Lines: None     Cardiac Monitoring: None  Code Status: Full Code    Clinically Significant Risk Factors Present on Admission                                Disposition Plan      Expected Discharge Date: 06/11/2023                    Elijah Troy DO  Hospitalist Service  [facility]  Securely " "message with Elis (more info)  Text page via [facility] Paging/Directory   ______________________________________________________________________    Chief Complaint   Constipation    History is obtained from the patient    History of Present Illness   Yumi Cutler is a 52 year old female who has a history of malignant neoplasm of breast and constipation and is admitted for constipation.    Patient states that she has been constipated without bowel movements for the past 2 weeks.  Visited [facility] emergency department on 6/8/2023 for similar complaints.  CT showed no obstruction at that time but did have large amount of stool in the colon as well as mild nonspecific colitis.  She was given an enema with just a very small bowel movement and told to use MiraLAX outpatient.  Rectal exam performed at that time without any fecal impaction.  Today she reports that she still has not gone and she is having worsening abdominal pain diffusely.  The only bits of stool that she is passing are small david, reports passing some gas.  Denies any blood in her stool or hematemesis.  Endorses some nausea and vomiting.    She reports a history of similar constipation with roughly an episode 8 years ago where she states that the gastroenterologist had to go and \"burst through it\".  Per chart review patient was admitted 4 years ago for similar complaints and underwent Gastrografin enema at that time.    In the ED, patient was given some pain medication, Zofran.  CT revealed large stool burden and again some mild colitis.    Past Medical History    Past Medical History:   Diagnosis Date     ADHD (attention deficit hyperactivity disorder)      Anxiety      Breast cancer (H)      Depression       Past Surgical History   Past Surgical History:   Procedure Laterality Date     APPENDECTOMY       CHOLECYSTECTOMY       MASTECTOMY Bilateral      Prior to Admission Medications   Prior to Admission Medications   Prescriptions Last Dose Informant " Patient Reported? Taking?   ALPRAZolam (XANAX) 0.5 MG tablet More than a month at unknown  Yes Yes   Sig: Take 0.5 mg by mouth nightly as needed   FLUoxetine (PROZAC) 40 MG capsule 6/8/2023 at unknown  Yes Yes   Sig: Take 40 mg by mouth daily   acyclovir (ZOVIRAX) 400 MG tablet More than a month at unknown  Yes Yes   Sig: [ACYCLOVIR (ZOVIRAX) 400 MG TABLET] TAKE 1 TABLET BY MOUTH THREE TIMES DAILY AS NEEDED   buPROPion (WELLBUTRIN XL) 300 MG 24 hr tablet 6/8/2023 at unknown  Yes Yes   Sig: [BUPROPION (WELLBUTRIN XL) 300 MG 24 HR TABLET] Take 300 mg by mouth daily.   dextroamphetamine-amphetamine (ADDERALL XR) 30 MG 24 hr capsule 6/8/2023 at unknown  Yes Yes   Sig: [DEXTROAMPHETAMINE-AMPHETAMINE (ADDERALL XR) 30 MG 24 HR CAPSULE] Take 30 mg by mouth every morning.   omeprazole (PRILOSEC) 20 MG DR capsule 6/8/2023 at unknown  Yes Yes   Sig: Take 20 mg by mouth daily   ondansetron (ZOFRAN ODT) 8 MG ODT tab 6/8/2023 at unknown  Yes Yes   Sig: Take 8 mg by mouth 3 times daily as needed for nausea   polyethylene glycol (MIRALAX) 17 g packet 6/10/2023 at 1000  Yes Yes   Sig: Take 1 packet by mouth daily as needed for constipation      Facility-Administered Medications: None        Review of Systems    CONSTITUTIONAL: NEGATIVE for fever, chills, change in weight  INTEGUMENTARY/SKIN: NEGATIVE for worrisome rashes, moles or lesions  EYES: NEGATIVE for vision changes or irritation  ENT/MOUTH: NEGATIVE for ear, mouth and throat problems  RESP: NEGATIVE for significant cough or SOB  BREAST: NEGATIVE for masses, tenderness or discharge  CV: NEGATIVE for chest pain, palpitations or peripheral edema  GI: POSITIVE for nausea, abdominal pain, conspitation  : NEGATIVE for frequency, dysuria, or hematuria  MUSCULOSKELETAL: NEGATIVE for significant arthralgias or myalgia  NEURO: NEGATIVE for weakness, dizziness or paresthesias  ENDOCRINE: NEGATIVE for temperature intolerance, skin/hair changes  HEME: NEGATIVE for bleeding  problems  PSYCHIATRIC: NEGATIVE for changes in mood or affect    Social History   I have reviewed this patient's social history and updated it with pertinent information if needed.  Social History     Tobacco Use     Smoking status: Never     Smokeless tobacco: Never   Substance Use Topics     Alcohol use: Yes     Comment: Alcoholic Drinks/day: Occasional     Drug use: Never     Family History   No significant family history.     Allergies   Allergies   Allergen Reactions     Corn Containing Products [Corn Oil] Unknown     Penicillins Itching        Physical Exam   Vital Signs: Temp: 97.9  F (36.6  C) Temp src: Oral BP: 106/68 Pulse: 81   Resp: 15 SpO2: 95 %      Weight: 155 lbs 0 oz  General: alert, appears comfortable, no acute distress  HEENT: atraumatic, conjunctiva clear without erythema, EOM's intact, no nasal discharge  Neck: supple  Cardiac: RRR w/o audible murmur  Resp: bilateral clear lungs w/o wheezing, crackles or rhonchi; breathing comfortably on RA  Abdomen: Soft, mildly TTP diffusely over abdomen. No masses. Bowel sounds present  Extremities: no peripheral edema  Skin: no rashes or suspicious legions on exposed skin  Neuro: grossly normal CN; normal strength, sensation, & tone  Psych: affect congruent with mood    Data     I have personally reviewed the following data over the past 24 hrs:    10.6  \   14.0   / 213     137 102 12 /  97   4.3 27 0.85 \       ALT: 16 AST: 18 AP: 64 TBILI: 1.0   ALB: 3.9 TOT PROTEIN: 6.6 LIPASE: 18       Procal: N/A CRP: N/A Lactic Acid: 0.7         Imaging results reviewed over the past 24 hrs:   Recent Results (from the past 24 hour(s))   CT Abdomen Pelvis w Contrast    Narrative    EXAM: CT ABDOMEN PELVIS W CONTRAST  LOCATION: [facility]  DATE/TIME: 6/10/2023 6:05 PM CDT    INDICATION: Worsening lower abdominal pain (worse in LLQ) lack of bowel movement, vomiting, eval for obstruction, perforation, stercoral colitis, diverticulitis,  etc.  COMPARISON: 06/09/2023.  TECHNIQUE: CT scan of the abdomen and pelvis was performed following injection of IV contrast. Multiplanar reformats were obtained. Dose reduction techniques were used.  CONTRAST: 100 ml Isovue 370.    FINDINGS:   LOWER CHEST: Breast prostheses.    HEPATOBILIARY: Cholecystectomy. Focal fat medial segment left hepatic lobe.    PANCREAS: Normal.    SPLEEN: Normal.    ADRENAL GLANDS: Normal.    KIDNEYS/BLADDER: Normal.    BOWEL: Constipation which is slightly improved. Again seen is wall thickening beginning mid descending colon and also involving the upper sigmoid colon with pericolonic soft tissue stranding compatible with segmental colitis.    LYMPH NODES: Normal.    VASCULATURE: Unremarkable.    PELVIC ORGANS: Bilateral Essure coils in the fallopian tubes.    MUSCULOSKELETAL: Normal.      Impression    IMPRESSION:   1.  Constipation slightly improved. Again seen is segmental colitis involving the mid and lower descending colon as well as the upper sigmoid colon.    2.  Bilateral Essure fallopian tube coils.    3.  Cholecystectomy.

## 2023-06-11 NOTE — PROVIDER NOTIFICATION
Gave patient enema compound per MAR with no stool afterwards. Patient has not stooled much since admission and has only had enemas, go lightly suspension that was ordered previously was also cancelled for unknown reason. Spoke with [facility] resident, RN suggested more oral stool softeners, and resident reordered go lightly suspension. RN suggested GI consult, resident states that consult is not indicated at this time

## 2023-06-12 ENCOUNTER — APPOINTMENT (OUTPATIENT)
Dept: RADIOLOGY | Facility: CLINIC | Age: 53
End: 2023-06-12
Attending: PHYSICIAN ASSISTANT
Payer: COMMERCIAL

## 2023-06-12 LAB — TSH SERPL DL<=0.005 MIU/L-ACNC: 1.2 UIU/ML (ref 0.3–5)

## 2023-06-12 PROCEDURE — 250N000013 HC RX MED GY IP 250 OP 250 PS 637

## 2023-06-12 PROCEDURE — 250N000013 HC RX MED GY IP 250 OP 250 PS 637: Performed by: STUDENT IN AN ORGANIZED HEALTH CARE EDUCATION/TRAINING PROGRAM

## 2023-06-12 PROCEDURE — 96376 TX/PRO/DX INJ SAME DRUG ADON: CPT

## 2023-06-12 PROCEDURE — 96375 TX/PRO/DX INJ NEW DRUG ADDON: CPT

## 2023-06-12 PROCEDURE — 86364 TISS TRNSGLTMNASE EA IG CLAS: CPT | Performed by: PHYSICIAN ASSISTANT

## 2023-06-12 PROCEDURE — 36415 COLL VENOUS BLD VENIPUNCTURE: CPT | Performed by: PHYSICIAN ASSISTANT

## 2023-06-12 PROCEDURE — 74270 X-RAY XM COLON 1CNTRST STD: CPT

## 2023-06-12 PROCEDURE — 99232 SBSQ HOSP IP/OBS MODERATE 35: CPT | Mod: GC

## 2023-06-12 PROCEDURE — 258N000003 HC RX IP 258 OP 636: Performed by: EMERGENCY MEDICINE

## 2023-06-12 PROCEDURE — 84443 ASSAY THYROID STIM HORMONE: CPT | Performed by: PHYSICIAN ASSISTANT

## 2023-06-12 PROCEDURE — 250N000011 HC RX IP 250 OP 636: Performed by: STUDENT IN AN ORGANIZED HEALTH CARE EDUCATION/TRAINING PROGRAM

## 2023-06-12 PROCEDURE — 82784 ASSAY IGA/IGD/IGG/IGM EACH: CPT | Performed by: PHYSICIAN ASSISTANT

## 2023-06-12 PROCEDURE — 96361 HYDRATE IV INFUSION ADD-ON: CPT

## 2023-06-12 PROCEDURE — 250N000011 HC RX IP 250 OP 636

## 2023-06-12 PROCEDURE — G0378 HOSPITAL OBSERVATION PER HR: HCPCS

## 2023-06-12 RX ORDER — DICYCLOMINE HYDROCHLORIDE 10 MG/1
10 CAPSULE ORAL 3 TIMES DAILY PRN
Status: DISCONTINUED | OUTPATIENT
Start: 2023-06-12 | End: 2023-06-13 | Stop reason: HOSPADM

## 2023-06-12 RX ORDER — KETOROLAC TROMETHAMINE 30 MG/ML
15 INJECTION, SOLUTION INTRAMUSCULAR; INTRAVENOUS ONCE
Status: COMPLETED | OUTPATIENT
Start: 2023-06-12 | End: 2023-06-12

## 2023-06-12 RX ORDER — KETOROLAC TROMETHAMINE 30 MG/ML
30 INJECTION, SOLUTION INTRAMUSCULAR; INTRAVENOUS ONCE
Status: DISCONTINUED | OUTPATIENT
Start: 2023-06-12 | End: 2023-06-12

## 2023-06-12 RX ORDER — KETOROLAC TROMETHAMINE 30 MG/ML
30 INJECTION, SOLUTION INTRAMUSCULAR; INTRAVENOUS EVERY 6 HOURS PRN
Status: DISCONTINUED | OUTPATIENT
Start: 2023-06-12 | End: 2023-06-13 | Stop reason: HOSPADM

## 2023-06-12 RX ADMIN — KETOROLAC TROMETHAMINE 30 MG: 30 INJECTION, SOLUTION INTRAMUSCULAR; INTRAVENOUS at 17:09

## 2023-06-12 RX ADMIN — ACETAMINOPHEN 650 MG: 325 TABLET ORAL at 12:43

## 2023-06-12 RX ADMIN — SODIUM CHLORIDE: 9 INJECTION, SOLUTION INTRAVENOUS at 09:29

## 2023-06-12 RX ADMIN — HYDROXYZINE HYDROCHLORIDE 25 MG: 25 TABLET ORAL at 12:43

## 2023-06-12 RX ADMIN — KETOROLAC TROMETHAMINE 15 MG: 30 INJECTION, SOLUTION INTRAMUSCULAR; INTRAVENOUS at 18:59

## 2023-06-12 RX ADMIN — BUPROPION HYDROCHLORIDE 300 MG: 300 TABLET, EXTENDED RELEASE ORAL at 09:56

## 2023-06-12 RX ADMIN — PROCHLORPERAZINE EDISYLATE 10 MG: 5 INJECTION INTRAMUSCULAR; INTRAVENOUS at 17:03

## 2023-06-12 RX ADMIN — DICYCLOMINE HYDROCHLORIDE 10 MG: 10 CAPSULE ORAL at 20:39

## 2023-06-12 RX ADMIN — DIATRIZOATE MEGLUMINE AND DIATRIZOATE SODIUM 720 ML: 660; 100 SOLUTION ORAL; RECTAL at 16:02

## 2023-06-12 RX ADMIN — ONDANSETRON 4 MG: 2 INJECTION INTRAMUSCULAR; INTRAVENOUS at 15:58

## 2023-06-12 RX ADMIN — PANTOPRAZOLE SODIUM 40 MG: 40 TABLET, DELAYED RELEASE ORAL at 09:56

## 2023-06-12 RX ADMIN — LINACLOTIDE 145 MCG: 145 CAPSULE, GELATIN COATED ORAL at 11:04

## 2023-06-12 RX ADMIN — ACETAMINOPHEN 650 MG: 325 TABLET ORAL at 20:39

## 2023-06-12 RX ADMIN — FLUOXETINE 40 MG: 20 CAPSULE ORAL at 09:56

## 2023-06-12 ASSESSMENT — ACTIVITIES OF DAILY LIVING (ADL)
ADLS_ACUITY_SCORE: 21

## 2023-06-12 NOTE — PLAN OF CARE
"PRIMARY DIAGNOSIS: \"GENERIC\" NURSING  OUTPATIENT/OBSERVATION GOALS TO BE MET BEFORE DISCHARGE:  ADLs back to baseline: Yes    Activity and level of assistance: Ambulating independently.    Pain status: Improved-controlled with IV Toradol x1 overnight.     Return to near baseline physical activity: Yes     Discharge Planner Nurse   Safe discharge environment identified: Yes  Barriers to discharge: Yes, has not had a regular stool yet. No success with enemas and polyethylene glycol. Pt is interested in a GI consult.        Entered by: Blaise Pedersen RN 06/12/2023 6:02 AM     Please review provider order for any additional goals.   Nurse to notify provider when observation goals have been met and patient is ready for discharge.  "

## 2023-06-12 NOTE — PROGRESS NOTES
Patient still in pain 8/10. Writer paged and spoke with Dr. Howard, he is going to discuss with evening resident. Writer did tell him that I discussed how the use of narcotics can slow motility but she reports being in a lot of pain and wanting something. Will await further instruction.

## 2023-06-12 NOTE — CONSULTS
"GI CONSULT NOTE      Name: Yumi Cutler  Medical Record #: [id number]  YOB: 1970  Date of Admission: 6/10/2023  Date/Time: 6/12/2023/10:47 AM     CHIEF COMPLAINT: Constipation    HISTORY OF PRESENT ILLNESS: We were asked to see Yumi Cutler by Dr Howard for evaluation of constipation. Yumi Cutler is a 52 year old year old female with history of cholecystectomy, appendectomy, depression, ADHA, breast cancer, and constipation as seen on prior imaging (gastrograffin enema 2019, CT abd and pelvis 6/2019) who presented to the ER with symptoms of constipation and lack of BM for ~14 days. CT abdomen and pelvis showed wall thickening in the mid descending colon and upper sigmoid with pericolonic soft tissue stranding raising concern for segmental colitis. I do see that prior imaging in 2019 showed similar findings. Laboratory evaluation included unremarkable CBC, CMP, lipase, and UA. She was admitted and started on Linzess 145mcg daily and has been given a Golytely prep (not able to tolerate) along with mineral oil enema. [facility] has been consulted for further recommendations.   The patient describes having severe constipation \"every few years.\" A colonoscopy was scheduled with [facility] 8/29/22, but this was not pursued by the patient as \"something came up.\" She believes she had a colonoscopy ~8 years ago, but I am not able to secure the report and she does not recall where the procedure took place.   The patient initially presented to [facility] 6/8/23 with constipation. CT abdomen and pelvis did not reveal obstruction. She was discharged from the ER with instructions to take Miralax, but did not have significant results. She was then admitted to  6/11/23 with generalized abdominal discomfort, fullness, nausea, and inability to keep up with oral needs due to fullness. No report of fecal leakage or gi bleeding. Since admission, she has tried to take the Golytely prep, but has " difficulties tolerating the liquid due to fullness. She has not had vomiting. She is eating very little and believes her weight is down 5# over the past few weeks.   No family history of colon cancer, IBD, thyroid disease, or celiac.   She has not had any recent medication changes.  Denies recreational drug use, tobacco use, and also denies using alcohol in excess.     Previous bouts of constipation have coincided with stressful events.  She does indicate that about 3 weeks ago her daughter needed an emergent surgery and therefore the patient had to fly to [place] to care for her daughter. This was a stressful event for the patient. Her constipation worsened after this trip.  She denies having urinary symptoms.    Prior to a few weeks ago, the patient reported passing stool 3-4 times per week and would use Miralax as needed (rare, maybe once per month).             REVIEW OF SYSTEMS (ROS): Complete review of systems negative other than listed in HPI.    PAST MEDICAL HISTORY:  Past Medical History:   Diagnosis Date     ADHD (attention deficit hyperactivity disorder)      Anxiety      Breast cancer (H)      Depression         MEDICATIONS PRIOR TO ADMISSION:   Medications Prior to Admission   Medication Sig Dispense Refill Last Dose     acyclovir (ZOVIRAX) 400 MG tablet [ACYCLOVIR (ZOVIRAX) 400 MG TABLET] TAKE 1 TABLET BY MOUTH THREE TIMES DAILY AS NEEDED  2 More than a month at unknown     ALPRAZolam (XANAX) 0.5 MG tablet Take 0.5 mg by mouth nightly as needed  5 More than a month at unknown     buPROPion (WELLBUTRIN XL) 300 MG 24 hr tablet [BUPROPION (WELLBUTRIN XL) 300 MG 24 HR TABLET] Take 300 mg by mouth daily.   6/8/2023 at unknown     dextroamphetamine-amphetamine (ADDERALL XR) 30 MG 24 hr capsule [DEXTROAMPHETAMINE-AMPHETAMINE (ADDERALL XR) 30 MG 24 HR CAPSULE] Take 30 mg by mouth every morning.   6/8/2023 at unknown     FLUoxetine (PROZAC) 40 MG capsule Take 40 mg by mouth daily   6/8/2023 at unknown      "omeprazole (PRILOSEC) 20 MG DR capsule Take 20 mg by mouth daily   6/8/2023 at unknown     ondansetron (ZOFRAN ODT) 8 MG ODT tab Take 8 mg by mouth 3 times daily as needed for nausea   6/8/2023 at unknown     polyethylene glycol (MIRALAX) 17 g packet Take 1 packet by mouth daily as needed for constipation   6/10/2023 at 1000          ALLERGIES: Penicillins    PHYSICAL EXAM:    /73 (BP Location: Left arm, Patient Position: Semi-Bennett's)   Pulse 75   Temp 98.1  F (36.7  C) (Oral)   Resp 16   Ht 1.676 m (5' 5.98\")   Wt 70.3 kg (155 lb)   SpO2 96%   BMI 25.03 kg/m      GENERAL: Pleasant, no obvious distress, responds appropriately  NECK: Supple without adenopathy  EYES: No scleral icterus  LUNGS: Clear to auscultation bilaterally  HEART: S1S2, no lower extremity edema  ABDOMEN: Non-distended. Positive bowel sounds. Soft, mild left-sided tenderness (primarily left upper abdomen), no guarding or rebound  MUSKULOSKELETAL:  Warm and well perfused, moves all extremities well  SKIN: No jaundice  NEUROLOGIC: Alert and oriented  PSYCHIATRIC: Normal affect    LAB DATA:  CMP Results:   Recent Labs   Lab Test 06/11/23  0125 06/10/23  1739 06/25/19  0608 06/24/19  1254 06/23/19  1946   NA  --  137 139 142 141   POTASSIUM  --  4.3 4.4 3.5 3.9   CHLORIDE  --  102 110* 108* 107   CO2  --  27 22 25 28   ANIONGAP  --  8 7 9 6   GLC  --  97 101 98 100   BUN  --  12 7* 12 16   CR 0.81 0.85 0.76 0.80 0.93   BILITOTAL  --  1.0  --   --  0.8   ALKPHOS  --  64  --   --  76   ALT  --  16  --   --  23   AST  --  18  --   --  22      CBC  Recent Labs   Lab 06/10/23  1739   WBC 10.6   RBC 4.20   HGB 14.0   HCT 40.6   MCV 97   MCH 33.3*   MCHC 34.5   RDW 12.2        INRNo lab results found in last 7 days.   Lipase   Date Value Ref Range Status   06/10/2023 18 0 - 52 U/L Final       IMAGING:  EXAM: CT ABDOMEN PELVIS W CONTRAST  LOCATION: [facility]  DATE/TIME: 6/10/2023 6:05 PM CDT     INDICATION: " Worsening lower abdominal pain (worse in LLQ) lack of bowel movement, vomiting, eval for obstruction, perforation, stercoral colitis, diverticulitis, etc.  COMPARISON: 06/09/2023.  TECHNIQUE: CT scan of the abdomen and pelvis was performed following injection of IV contrast. Multiplanar reformats were obtained. Dose reduction techniques were used.  CONTRAST: 100 ml Isovue 370.     FINDINGS:   LOWER CHEST: Breast prostheses.     HEPATOBILIARY: Cholecystectomy. Focal fat medial segment left hepatic lobe.     PANCREAS: Normal.     SPLEEN: Normal.     ADRENAL GLANDS: Normal.     KIDNEYS/BLADDER: Normal.     BOWEL: Constipation which is slightly improved. Again seen is wall thickening beginning mid descending colon and also involving the upper sigmoid colon with pericolonic soft tissue stranding compatible with segmental colitis.     LYMPH NODES: Normal.     VASCULATURE: Unremarkable.     PELVIC ORGANS: Bilateral Essure coils in the fallopian tubes.     MUSCULOSKELETAL: Normal.                                                                      IMPRESSION:   1.  Constipation slightly improved. Again seen is segmental colitis involving the mid and lower descending colon as well as the upper sigmoid colon.     2.  Bilateral Essure fallopian tube coils.     3.  Cholecystectomy.    ASSESSMENT:  Constipation-- This is a 53yo female with history of cholecystectomy, appendectomy, depression, ADHA, breast cancer, and constipation as seen on prior imaging (gastrograffin enema 2019, CT abd and pelvis 6/2019) who presented to the ER with symptoms of severe constipation. CT abdomen and pelvis shows a large amount of stool in the colon with wall thickening in the mid descending and upper sigmoid colon compatible with segmental colitis. These findings are similar to those seen in 2019. I suspect the abnormalities noted on imaging are related to severe constipation and possibly stercoral colitis vs. Possible ischemia, but I think this  is less likely. Malignancy is possible, but less likely given the presence of similar findings in 2019. Constipation predominant irritable bowel syndrome is possible given the patient's report of worsening symptoms with stress.   For now, will check thyroid function and a screening test for celiac.The patient has a large amount of stool in the colon despite initiation of Miralax, so will plan to start Linzess and proceed with gastrograffin enema. Ultimately, the patient will need a colonoscopy, but this can be arranged as an outpatient.     PLAN:  The patient is encouraged to increase fluid intake if possible (6-8cups of water per day).  We will check thyroid function and screening test for celiac.  Start Linzess 145mcg daily.  Proceed with gastrograffin enema.   If fails to improve, could try higher dose of Linzess, add Miralax and/or oral Magnesium but will continue to follow.   Will arrange for outpatient colonoscopy.     Total time spent on this encounter =50 minutes.   Discussed with Dr. Martínez who will also visit with the patient.                                                Ange Johnson PA-C  Thank you for the opportunity to participate in the care of this patient.   Please feel free to call me with any questions or concerns.  Phone number [phone].            Agree with above note and examination by Ange Johnson PA-C.    52 F admitted with left sided abdominal pain and constipation   CT with mild sigmoid and descending colitis and extensive stool burden  Pt notes baseline bowel activity with a soft stool every 3 days. She does report straining with bowel movements. Not previously on laxatives. She has now had minimal output for 2 weeks  No prior colonoscopy  Physical exam shows 52 F in mild distress. Chest/Pulm exam normal. Abd with left sided TTP   A/P 52 F with constipation and minimal stool output for 2 weeks with left sided pain and sigmoid/descending colitis    Colitis- stercoral from stool  burden vs ischemic injury prompting constipation. Favor stercoral injury.    Currently on Linzess, colon prep and GG enema which should alleviate constipation.  GG enema will rule out obstructive cause   Will plan outpatient colonoscopy   Recommend discharging on Linzess dose- titrated to soft formed daily stool.     Approximately 24 minutes of total time was spent providing patient care, including patient evaluation, reviewing documentation/test result, and .   Shyam Martínez M.D.  [facility]  [phone]- business phone number (for scheduling)

## 2023-06-12 NOTE — PROGRESS NOTES
"Patient in pain. She wanted to \"push through it\" but is requesting medication. Writer paged [facility]. Awaiting return page  "

## 2023-06-12 NOTE — CONSULTS
"ACUPUNCTURIST TREATMENT NOTE    Name: Yumi Cutler  :  1970  MRN:  [id number]    Acupuncture Treatment  Patient Type: Medical  Intervention Reason: Pain, GI Support Specify:  Gi Support Specify:: Constipation  Pain Location: Abdomen  Pre-session Pain Rating: 3  Patient complaint:: Abdominal pain and constipation  Initial insertions: Duncan 6, 10, 12, 13, St 25, 36, P 6  Number of needles inserted: 10  Number of needles removed: 10         \"Risks and benefits of acupuncture were discussed with patient. Consent for treatment was given. We thank you for the referral.\"     Jacobo Moore    Date:  2023  Time:  12:12 PM    "

## 2023-06-12 NOTE — UTILIZATION REVIEW
Continued stay Observation     Concurrent stay review; Secondary Review Determination         Under the authority of the Utilization Management Committee, the utilization review process indicated a secondary review on the above patient.  The review outcome is based on review of the medical records, discussions with staff, and applying clinical experience noted on the date of the review.          (x) Observation Status Appropriate - Concurrent stay review    RATIONALE FOR DETERMINATION     51 years old female with past medical history significant for constipation, depression, anxiety, ADHD, breast cancer.  Patient presented to emergency department for evaluation of constipation.  CT scan showed no obstruction, but did have large amount of stool in the colon. GI consulted and recommend starting linzess and Gastrografin enema today.    Patient is clinically improving and there is no clear indication to change patient's status to inpatient. The severity of illness, intensity of service provided, expected LOS and risk for adverse outcome make the care appropriate for observation.    The information on this document is developed by the utilization review team in order for the business office to ensure compliance.  This only denotes the appropriateness of proper admission status and does not reflect the quality of care rendered.         The definitions of Inpatient Status and Observation Status used in making the determination above are those provided in the CMS Coverage Manual, Chapter 1 and Chapter 6, section 70.4.      Sincerely,   Kevin Amin MD    Utilization Review  Physician Advisor  [facility].

## 2023-06-12 NOTE — PROVIDER NOTIFICATION
Updated [facility] resident on pt status. No BM overnight after administration of polyethylene glycol. Provider will speak with treatment team and see if they want to add any new meds to the treatment plan. Provider also informed that pt is interested in meeting with GI. Will continue to monitor.

## 2023-06-12 NOTE — PROGRESS NOTES
Care Management Initial Consult    General Information  Assessment completed with:  ,    Type of CM/SW Visit: Chart Assessment      Communication Assessment  Patient's communication style: spoken language (English or Bilingual)    Hearing Difficulty or Deaf: no   Wear Glasses or Blind: yes    Cognitive  Cognitive/Neuro/Behavioral: WDL                         Equipment currently used at home: none    Lifestyle & Psychosocial Needs:  Social Determinants of Health     Tobacco Use: Low Risk  (6/11/2023)    Patient History      Smoking Tobacco Use: Never      Smokeless Tobacco Use: Never      Passive Exposure: Not on file   Alcohol Use: Not on file   Financial Resource Strain: Not on file   Food Insecurity: Not on file   Transportation Needs: Not on file   Physical Activity: Not on file   Stress: Not on file   Social Connections: Not on file   Intimate Partner Violence: Not on file   Depression: Not on file   Housing Stability: Not on file                    Additional Information:  7:51 AM  Chart reviewed.  Anticipate discharge home, no needs.  Anticipate family transport.    ELLE Perla

## 2023-06-12 NOTE — PROGRESS NOTES
"    [facility]    Progress Note - Hospitalist Service       Date of Admission:  6/10/2023    Assessment & Plan   Severe Constipation  Chronic colitis?  Presented to the ED reporting not having a solid bowel movement in 14 days.  Previously visited [facility] emergency department for similar symptoms on 6/8/2023.  Also reports a history of severe constipation both 4 and 8 years ago.  In 2019 she was admitted for the severe constipation which improved with Gastrografin enema, at that time it revealed a short segment of colon does not significantly distended which could be possibly related to a stricture and recommended colonoscopy for follow-up.  Patient reports she has not had a colonoscopy since 8 years ago which was \"all normal\".  At that time patient also had wall thickening which could have been inflammatory.  Segmental colitis seen on CT this visit.  Unsure recurrent cause of severe constipation however will likely recommend GI follow-up outpatient if patient is able to tolerate course and advance her diet.  -GI consulted given continued constipation and per patient request, appreciate recommendations:   -Per GI - increase oral intake to 6-8 cups of water per day   -thyroid and celiac testing - per GI   -repeat gastrografin enema - pending   -Start Linzess    -will re-evaluate and if fails to improve could increase Linzess, add Miralax and/or oral magnesium per GI   -GI to arrange outpatient colonoscopy   -initial gastrografin enema and pink lady enema administered 6/11/2023 with little stool output noted   -gastrografin enema did not include XR as radiology was not immediately available for imaging   -patient drank about 1/3 of 4,000ml GoLytely bowel prep yesterday evening, no reported stool output following this  -Zofran, compazine for nausea  -Tylenol, toradol for pain, hold off of opioid pain medications given constipation     Depression  Anxiety  -PTA Bupropion, fluoxetine   -PTA " "alprazolam prn      ADHD  -PTA adderall         Diet: Regular Diet Adult    DVT Prophylaxis: Enoxaparin (Lovenox) SQ  Jackson Catheter: Not present  Fluids: PO  Lines: None     Cardiac Monitoring: None  Code Status: Full Code      Clinically Significant Risk Factors Present on Admission                       # Overweight: Estimated body mass index is 25.03 kg/m  as calculated from the following:    Height as of this encounter: 1.676 m (5' 5.98\").    Weight as of this encounter: 70.3 kg (155 lb).            Disposition Plan      Expected Discharge Date: 06/12/2023                The patient's care was discussed with the Attending Physician, Dr. Tapan Fischer.    Joshua Howard   Hospitalist Service  [facility]  Securely message with [employer] (more info)  Text page via [employer] Paging/Directory   ______________________________________________________________________    Interval History   Patient reports that she has had continued constipation despite enema attempts and following GoLytely. She reports mild discomfort in LUQ quadrant. She denies headache, fever, chills, nausea, vomiting, shortness of breath. She requests a GI consult today and is \"willing to try anything\" to have a bowel movement.     Physical Exam   Vital Signs: Temp: 98.1  F (36.7  C) Temp src: Oral BP: 116/73 Pulse: 75   Resp: 16 SpO2: 96 % O2 Device: None (Room air)    Weight: 155 lbs 0 oz    General: alert, appears mildly uncomfortable, no acute distress  HEENT: atraumatic, conjunctiva clear without erythema, EOM's intact, no nasal discharge  Neck: supple  Cardiac: RRR w/o audible murmur  Resp: bilateral clear lungs w/o wheezing, crackles or rhonchi; breathing comfortably on RA  Abdomen: Soft, mildly tender in left upper quadrant. Normoactive bowel sounds present in all four quadrants  Extremities: no peripheral edema  Skin: no rashes or suspicious legions on exposed skin  Neuro: grossly normal CN; normal strength, sensation, & " tone  Psych: affect congruent with mood    Medical Decision Making       Please see A&P for additional details of medical decision making.

## 2023-06-12 NOTE — PLAN OF CARE
"PRIMARY DIAGNOSIS: \"GENERIC\" NURSING  OUTPATIENT/OBSERVATION GOALS TO BE MET BEFORE DISCHARGE:  1. ADLs back to baseline: Yes    2. Activity and level of assistance: Ambulating independently.    3. Pain status: Improved-controlled with oral pain medications.    4. Return to near baseline physical activity:  Yes     Discharge Planner Nurse   Safe discharge environment identified: Yes  Barriers to discharge: Yes, Patient needs to have a significant bowel movement       Entered by: Meggan Field RN 06/12/2023 8:39 AM     Please review provider order for any additional goals.   Nurse to notify provider when observation goals have been met and patient is ready for discharge.  "

## 2023-06-13 VITALS
SYSTOLIC BLOOD PRESSURE: 121 MMHG | RESPIRATION RATE: 18 BRPM | DIASTOLIC BLOOD PRESSURE: 74 MMHG | WEIGHT: 155 LBS | HEART RATE: 70 BPM | OXYGEN SATURATION: 97 % | HEIGHT: 66 IN | BODY MASS INDEX: 24.91 KG/M2 | TEMPERATURE: 98.5 F

## 2023-06-13 LAB — IGA SERPL-MCNC: 104 MG/DL (ref 84–499)

## 2023-06-13 PROCEDURE — 96376 TX/PRO/DX INJ SAME DRUG ADON: CPT

## 2023-06-13 PROCEDURE — 96375 TX/PRO/DX INJ NEW DRUG ADDON: CPT

## 2023-06-13 PROCEDURE — G0378 HOSPITAL OBSERVATION PER HR: HCPCS

## 2023-06-13 PROCEDURE — 250N000013 HC RX MED GY IP 250 OP 250 PS 637: Performed by: STUDENT IN AN ORGANIZED HEALTH CARE EDUCATION/TRAINING PROGRAM

## 2023-06-13 PROCEDURE — 250N000011 HC RX IP 250 OP 636

## 2023-06-13 PROCEDURE — 250N000011 HC RX IP 250 OP 636: Performed by: FAMILY MEDICINE

## 2023-06-13 PROCEDURE — 250N000013 HC RX MED GY IP 250 OP 250 PS 637

## 2023-06-13 PROCEDURE — 99238 HOSP IP/OBS DSCHRG MGMT 30/<: CPT | Mod: GC

## 2023-06-13 RX ORDER — DIPHENHYDRAMINE HYDROCHLORIDE 50 MG/ML
25 INJECTION INTRAMUSCULAR; INTRAVENOUS ONCE
Status: COMPLETED | OUTPATIENT
Start: 2023-06-13 | End: 2023-06-13

## 2023-06-13 RX ORDER — POLYETHYLENE GLYCOL 3350 17 G/17G
17 POWDER, FOR SOLUTION ORAL DAILY
Qty: 510 G | Refills: 0 | Status: SHIPPED | OUTPATIENT
Start: 2023-06-13

## 2023-06-13 RX ORDER — POLYETHYLENE GLYCOL 3350 17 G/17G
17 POWDER, FOR SOLUTION ORAL DAILY
Status: DISCONTINUED | OUTPATIENT
Start: 2023-06-13 | End: 2023-06-13 | Stop reason: HOSPADM

## 2023-06-13 RX ADMIN — DIPHENHYDRAMINE HYDROCHLORIDE 25 MG: 50 INJECTION, SOLUTION INTRAMUSCULAR; INTRAVENOUS at 08:54

## 2023-06-13 RX ADMIN — BUPROPION HYDROCHLORIDE 300 MG: 300 TABLET, EXTENDED RELEASE ORAL at 08:14

## 2023-06-13 RX ADMIN — KETOROLAC TROMETHAMINE 30 MG: 30 INJECTION, SOLUTION INTRAMUSCULAR; INTRAVENOUS at 00:47

## 2023-06-13 RX ADMIN — HYDROXYZINE HYDROCHLORIDE 25 MG: 25 TABLET ORAL at 10:10

## 2023-06-13 RX ADMIN — PANTOPRAZOLE SODIUM 40 MG: 40 TABLET, DELAYED RELEASE ORAL at 08:14

## 2023-06-13 RX ADMIN — FLUOXETINE 40 MG: 20 CAPSULE ORAL at 08:14

## 2023-06-13 RX ADMIN — LINACLOTIDE 145 MCG: 145 CAPSULE, GELATIN COATED ORAL at 08:14

## 2023-06-13 ASSESSMENT — ACTIVITIES OF DAILY LIVING (ADL)
ADLS_ACUITY_SCORE: 21

## 2023-06-13 NOTE — PROGRESS NOTES
"  GASTROENTEROLOGY PROGRESS NOTE     SUBJECTIVE   The patient reports having return of bowel function after gastrograffin enema. She is beginning to eat more and generally feels well. Hopes to discharge to home.      OBJECTIVE     Vitals Blood pressure 121/74, pulse 70, temperature 98.5  F (36.9  C), temperature source Oral, resp. rate 18, height 1.676 m (5' 5.98\"), weight 70.3 kg (155 lb), SpO2 97 %.          Physical Exam   General: awake, alert, responds appropriately    Cardiovascular: S1S2, no edema    Chest: lungs are clear     Abdomen: +bs, soft, not tender    Neurologic: grossly intact        LABORATORY    ELECTROLYTE PANEL   Recent Labs   Lab 06/11/23  0125 06/10/23  1739   NA  --  137   POTASSIUM  --  4.3   CHLORIDE  --  102   CO2  --  27   GLC  --  97   CR 0.81 0.85   BUN  --  12      HEMATOLOGY PANEL   Recent Labs   Lab 06/10/23  1739   HGB 14.0   MCV 97   WBC 10.6         LIVER AND PANCREAS PANEL   Recent Labs   Lab 06/10/23  1739   AST 18   ALT 16   ALKPHOS 64   BILITOTAL 1.0   LIPASE 18     IMAGING STUDIES      EXAM: XR COLON WATER SOLUBLE DIAGNOSTIC  LOCATION: [facility]  DATE/TIME: 6/12/2023 3:56 PM CDT     INDICATION: severe constipation, ?wall thickening mid descending, sigmoid  COMPARISON: CT 06/10/2023  TECHNIQUE: Routine.     FINDINGS:  FLUOROSCOPIC TIME: 2.7  NUMBER OF IMAGES: 17     COLON: Moderate amount stool in colon. Therefore, colonic masses could be missed. No mass or stricture seen. Special attention is directed to the distal descending/sigmoid colon. No abnormality seen.     Moderate amount stool in colon remains on the postevacuation film.                                                                      IMPRESSION:  1.  Moderate amount stool.  2.  No colonic abnormality seen.    EXAM: CT ABDOMEN PELVIS W CONTRAST  LOCATION: [facility]  DATE/TIME: 6/10/2023 6:05 PM CDT     INDICATION: Worsening lower abdominal pain (worse " in LLQ) lack of bowel movement, vomiting, eval for obstruction, perforation, stercoral colitis, diverticulitis, etc.  COMPARISON: 06/09/2023.  TECHNIQUE: CT scan of the abdomen and pelvis was performed following injection of IV contrast. Multiplanar reformats were obtained. Dose reduction techniques were used.  CONTRAST: 100 ml Isovue 370.     FINDINGS:   LOWER CHEST: Breast prostheses.     HEPATOBILIARY: Cholecystectomy. Focal fat medial segment left hepatic lobe.     PANCREAS: Normal.     SPLEEN: Normal.     ADRENAL GLANDS: Normal.     KIDNEYS/BLADDER: Normal.     BOWEL: Constipation which is slightly improved. Again seen is wall thickening beginning mid descending colon and also involving the upper sigmoid colon with pericolonic soft tissue stranding compatible with segmental colitis.     LYMPH NODES: Normal.     VASCULATURE: Unremarkable.     PELVIC ORGANS: Bilateral Essure coils in the fallopian tubes.     MUSCULOSKELETAL: Normal.                                                                      IMPRESSION:   1.  Constipation slightly improved. Again seen is segmental colitis involving the mid and lower descending colon as well as the upper sigmoid colon.     2.  Bilateral Essure fallopian tube coils.     3.  Cholecystectomy.         I have reviewed the current diagnostic and laboratory tests.              IMPRESSION   Constipation-- This is a 53yo female with history of cholecystectomy, appendectomy, depression, ADHA, breast cancer, and constipation as seen on prior imaging (gastrograffin enema 2019, CT abd and pelvis 6/2019) who presented to the ER with symptoms of severe constipation. CT abdomen and pelvis showed a large amount of stool in the colon with wall thickening in the mid descending and upper sigmoid colon compatible with segmental colitis. These findings are similar to those seen in 2019. I suspect the abnormalities noted on imaging are related to severe constipation and possibly stercoral  colitis vs. Less likely  Ischemia. Malignancy is possible, but even less likely given the presence of similar findings in 2019. Constipation predominant irritable bowel syndrome is possible given the patient's report of worsening symptoms with stress.   Gastrograffin enema 6/12/23 did not show obstructive lesion or stricture.   Thyroid function is within acceptable range. Screening test for celiac is pending. The patient has had return of bowel function--will plan to continue Linzess and Miralax. Plans will be made for outpatient follow up at [facility] and outpatient colonoscopy.          PLAN   Continue Linzess 145mcg daily (the patient is encouraged to call [facility] if the prescription is too expensive. In that case, would consider prescribing Amitiza or Trulance as alternatives).  Continue Miralax 1 capful daily for now. The patient is educated on adjusting the Miralax dose as needed for goal of BM daily or every other day.  Celiac screening test is pending.   I will arrange for outpatient follow up at [facility] and outpatient colonoscopy.   No objection to discharge to home later today/once cleared by primary team.     Total time spent on this encounter=35 minutes.         Ange Johnson PA-C  Thank you for the opportunity to participate in the care of this patient.   Please feel free to call me with any questions or concerns.  Phone number [phone].

## 2023-06-13 NOTE — PLAN OF CARE
"Patient is alert and oriented. She is up independently in room and walking hallways. She had 5 BMs overnight and one this morning. Patient reports pain in abdomen is much better. Pain in LUQ upon deep palpation. All quadrants audible/normoactive. Minimal low back pain. Patient is restless, IV benadryl administered. VSS. Possible discharge today.      Problem: Plan of Care - These are the overarching goals to be used throughout the patient stay.    Goal: Plan of Care Review  Description: The Plan of Care Review/Shift note should be completed every shift.  The Outcome Evaluation is a brief statement about your assessment that the patient is improving, declining, or no change.  This information will be displayed automatically on your shift note.  Outcome: Adequate for Care Transition  Goal: Patient-Specific Goal (Individualized)  Description: You can add care plan individualizations to a care plan. Examples of Individualization might be:  \"Parent requests to be called daily at 9am for status\", \"I have a hard time hearing out of my right ear\", or \"Do not touch me to wake me up as it startles me\".  Outcome: Adequate for Care Transition  Goal: Absence of Hospital-Acquired Illness or Injury  Outcome: Adequate for Care Transition  Intervention: Identify and Manage Fall Risk  Recent Flowsheet Documentation  Taken 6/13/2023 0735 by Meggan Field RN  Safety Promotion/Fall Prevention:   room organization consistent   room near nurse's station   safety round/check completed  Intervention: Prevent Skin Injury  Recent Flowsheet Documentation  Taken 6/13/2023 0735 by Meggan Field RN  Body Position: position changed independently  Intervention: Prevent and Manage VTE (Venous Thromboembolism) Risk  Recent Flowsheet Documentation  Taken 6/13/2023 0735 by Meggan Field RN  VTE Prevention/Management: (ambulating in room and hallways) SCDs (sequential compression devices) off  Goal: Optimal Comfort and " Wellbeing  Outcome: Adequate for Care Transition  Intervention: Monitor Pain and Promote Comfort  Recent Flowsheet Documentation  Taken 6/13/2023 0735 by Meggan Field, RN  Pain Management Interventions: heat applied  Goal: Readiness for Transition of Care  Outcome: Adequate for Care Transition

## 2023-06-13 NOTE — PROGRESS NOTES
Patient VSS. Patient has not had any more BMs since 0015. Patient is passing flatus. No c/o nausea. Still rating pain in left lower back 3/10, but declined PRN tylenol. Patient sleeping throughout the night. Patient up independently in room. PIV saline locked. Patient care and support on-going.  Mita Meza RN

## 2023-06-13 NOTE — PROVIDER NOTIFICATION
Dr. Bray, [facility] resident, notified that patient reported a large BM this evening. Provider states plan is to monitor patient overnight and provide pain control.  Mita Meza RN

## 2023-06-13 NOTE — PROGRESS NOTES
Patient VSS. Patient has had 5 large BMs since 1900 that were loose and brown. Patient does not complain of nausea. Patient is passing flatus. Rating pain at 3/10 and states the pain is in her lower left back. Pain controlled with toradol, tylenol and Bentyl. Patient up independently in room. Care and support on-going.  Mita Meza RN

## 2023-06-13 NOTE — PLAN OF CARE
"  Problem: Plan of Care - These are the overarching goals to be used throughout the patient stay.    Goal: Plan of Care Review  Description: The Plan of Care Review/Shift note should be completed every shift.  The Outcome Evaluation is a brief statement about your assessment that the patient is improving, declining, or no change.  This information will be displayed automatically on your shift note.  Outcome: Progressing  Goal: Patient-Specific Goal (Individualized)  Description: You can add care plan individualizations to a care plan. Examples of Individualization might be:  \"Parent requests to be called daily at 9am for status\", \"I have a hard time hearing out of my right ear\", or \"Do not touch me to wake me up as it startles me\".  Outcome: Progressing  Goal: Absence of Hospital-Acquired Illness or Injury  Outcome: Progressing  Intervention: Identify and Manage Fall Risk  Recent Flowsheet Documentation  Taken 6/13/2023 0454 by Mita Meza RN  Safety Promotion/Fall Prevention:   room organization consistent   room near nurse's station   safety round/check completed  Taken 6/13/2023 0015 by Mita Meza RN  Safety Promotion/Fall Prevention:   room organization consistent   room near nurse's station   safety round/check completed  Taken 6/12/2023 2000 by Mita Meza RN  Safety Promotion/Fall Prevention:   room organization consistent   room near nurse's station   safety round/check completed  Intervention: Prevent Skin Injury  Recent Flowsheet Documentation  Taken 6/13/2023 0454 by Mita Meza RN  Body Position: position changed independently  Taken 6/13/2023 0015 by Mita Meza RN  Body Position: position changed independently  Taken 6/12/2023 2000 by Mita Meza RN  Body Position: position changed independently  Intervention: Prevent and Manage VTE (Venous Thromboembolism) Risk  Recent Flowsheet Documentation  Taken 6/13/2023 0454 by Mita Meza RN  VTE Prevention/Management: " (ambulating in room and hallways) SCDs (sequential compression devices) off  Taken 6/13/2023 0015 by Mita Meza RN  VTE Prevention/Management: (ambulating in room and hallways) SCDs (sequential compression devices) off  Taken 6/12/2023 2000 by Mita Meza RN  VTE Prevention/Management: (ambulating in room and hallways) SCDs (sequential compression devices) off  Goal: Optimal Comfort and Wellbeing  Outcome: Progressing  Intervention: Monitor Pain and Promote Comfort  Recent Flowsheet Documentation  Taken 6/13/2023 0047 by Mita Meza RN  Pain Management Interventions:   medication (see MAR)   distraction  Goal: Readiness for Transition of Care  Outcome: Progressing     Problem: Pain Acute  Goal: Optimal Pain Control and Function  Outcome: Progressing  Intervention: Develop Pain Management Plan  Recent Flowsheet Documentation  Taken 6/13/2023 0047 by Mita Meza RN  Pain Management Interventions:   medication (see MAR)   distraction     Problem: Constipation  Goal: Effective Bowel Elimination  Outcome: Progressing   Goal Outcome Evaluation:    Patient VSS. C/o pain in left lower back, rating it 3/10. Patient taking tylenol, toradol, and Bentyl for pain. Patient had 5 large, loose BMs from [id number]. Patient states she has passed gas. No c/o nausea. Bowel sounds audible and normoactive. A&Ox4. Up independently in room. Patient slept most of night. Patient care and education on-going.  Mita Meza, RN

## 2023-06-13 NOTE — DISCHARGE SUMMARY
"[facility]  Discharge Summary - Medicine & Pediatrics       Date of Admission:  6/10/2023  Date of Discharge:  6/13/2023  Discharging Provider: Dr. Tapan Fischer  Discharge Service: Hospitalist Service    Discharge Diagnoses   Severe Constipation  Colitis  History of:   -Depression   -Anxiety   -ADHD      Clinically Significant Risk Factors     # Overweight: Estimated body mass index is 25.03 kg/m  as calculated from the following:    Height as of this encounter: 1.676 m (5' 5.98\").    Weight as of this encounter: 70.3 kg (155 lb).       Follow-ups Needed After Discharge   Recommend follow-up with GI per their recommendations. [facility] will help to facilitate outpatient colonoscopy. Recommend follow-up with PCP within one week for hospital follow-up.     Unresulted Labs Ordered in the Past 30 Days of this Admission     Date and Time Order Name Status Description    6/12/2023 10:15 AM Tissue transglutaminase rick IgA and IgG In process     6/12/2023 10:15 AM IgA In process       These results will be followed up by [facility]    Discharge Disposition   Discharged to home  Condition at discharge: Stable    Hospital Course   Yumi Cutler was admitted on 6/10/2023 for 14 days of constipation and abdominal pain.  The following problems were addressed during her hospitalization:    Constipation   Colitis  Abdominal pain  -Patient presented with 14 days of constipation and abdominal pain. Segmental colitis seen on CT this visit. XR COLON WATER SOLUBLE - showed no colonic abnormality but moderate amount of stool. Per GI, colitis likely due to stercoral injury in setting of chronic constipation.  Several modialties were attempted to promote patient having a bowel movment. She received several enemas, miralax, and worked with integrative medicine. GI was consulted and recommended addition of Linzess. Patient had a large bowel movement prior to discharge and reported significant improvement in her abdominal " discomfort.     Consultations This Hospital Stay   INTEGRATIVE THERAPIES CONSULT  ACUPUNCTURE IP CONSULT  GASTROENTEROLOGY IP CONSULT    Code Status   Full Code       The patient was discussed with Dr. Tapan Howard DO  [address] [address]  Phone: [phone]  Fax: [phone]  ______________________________________________________________________    Physical Exam   Vital Signs: Temp: 98.5  F (36.9  C) Temp src: Oral BP: 121/74 Pulse: 70   Resp: 18 SpO2: 97 % O2 Device: None (Room air)    Weight: 155 lbs 0 oz  General: alert, appears mildly uncomfortable, no acute distress  HEENT: atraumatic, conjunctiva clear without erythema, EOM's intact, no nasal discharge  Neck: supple  Cardiac: RRR w/o audible murmur  Resp: bilateral clear lungs w/o wheezing, crackles or rhonchi; breathing comfortably on RA  Abdomen: Soft, nontender x 4 quadrants, Normoactive bowel sounds present in all four quadrants  Extremities: no peripheral edema  Skin: no rashes or suspicious legions on exposed skin  Neuro: grossly normal CN; normal strength, sensation, & tone  Psych: affect congruent with mood      Primary Care Physician   French Leong    Discharge Orders      Reason for your hospital stay    Constipation     Follow-up and recommended labs and tests     Recommend following-up with [facility] per their recommendations. [facility] will help facilitate outpatient colonoscopy. Follow up with primary care provider, French Leong, within 7 days for hospital follow- up.     Activity    Your activity upon discharge: activity as tolerated     Diet    Follow this diet upon discharge: Orders Placed This Encounter      Regular Diet Adult. Attempt to increase water intake as well as fiber       Significant Results and Procedures   Most Recent 3 CBC's:  Recent Labs   Lab Test 06/10/23  1739 06/25/19  0608 06/24/19  1254   WBC 10.6  6.2 8.7   HGB 14.0 12.7 14.6   MCV 97 99 96    171 197     Most Recent 3 BMP's:  Recent Labs   Lab Test 06/11/23  0125 06/10/23  1739 06/25/19  0608 06/24/19  1254   NA  --  137 139 142   POTASSIUM  --  4.3 4.4 3.5   CHLORIDE  --  102 110* 108*   CO2  --  27 22 25   BUN  --  12 7* 12   CR 0.81 0.85 0.76 0.80   ANIONGAP  --  8 7 9   DARIELA  --  8.9 8.6 9.1   GLC  --  97 101 98   ,   Results for orders placed or performed during the hospital encounter of 06/10/23   CT Abdomen Pelvis w Contrast    Narrative    EXAM: CT ABDOMEN PELVIS W CONTRAST  LOCATION: [facility]  DATE/TIME: 6/10/2023 6:05 PM CDT    INDICATION: Worsening lower abdominal pain (worse in LLQ) lack of bowel movement, vomiting, eval for obstruction, perforation, stercoral colitis, diverticulitis, etc.  COMPARISON: 06/09/2023.  TECHNIQUE: CT scan of the abdomen and pelvis was performed following injection of IV contrast. Multiplanar reformats were obtained. Dose reduction techniques were used.  CONTRAST: 100 ml Isovue 370.    FINDINGS:   LOWER CHEST: Breast prostheses.    HEPATOBILIARY: Cholecystectomy. Focal fat medial segment left hepatic lobe.    PANCREAS: Normal.    SPLEEN: Normal.    ADRENAL GLANDS: Normal.    KIDNEYS/BLADDER: Normal.    BOWEL: Constipation which is slightly improved. Again seen is wall thickening beginning mid descending colon and also involving the upper sigmoid colon with pericolonic soft tissue stranding compatible with segmental colitis.    LYMPH NODES: Normal.    VASCULATURE: Unremarkable.    PELVIC ORGANS: Bilateral Essure coils in the fallopian tubes.    MUSCULOSKELETAL: Normal.      Impression    IMPRESSION:   1.  Constipation slightly improved. Again seen is segmental colitis involving the mid and lower descending colon as well as the upper sigmoid colon.    2.  Bilateral Essure fallopian tube coils.    3.  Cholecystectomy.   XR Colon Water Soluble Diagnostic    Narrative    EXAM: XR COLON  WATER SOLUBLE DIAGNOSTIC  LOCATION: [facility]  DATE/TIME: 6/12/2023 3:56 PM CDT    INDICATION: severe constipation, ?wall thickening mid descending, sigmoid  COMPARISON: CT 06/10/2023  TECHNIQUE: Routine.    FINDINGS:  FLUOROSCOPIC TIME: 2.7  NUMBER OF IMAGES: 17    COLON: Moderate amount stool in colon. Therefore, colonic masses could be missed. No mass or stricture seen. Special attention is directed to the distal descending/sigmoid colon. No abnormality seen.    Moderate amount stool in colon remains on the postevacuation film.      Impression    IMPRESSION:  1.  Moderate amount stool.  2.  No colonic abnormality seen.       Discharge Medications   Current Discharge Medication List      START taking these medications    Details   linaclotide (LINZESS) 145 MCG capsule Take 1 capsule (145 mcg) by mouth every morning (before breakfast) for 30 days  Qty: 30 capsule, Refills: 0    Associated Diagnoses: Constipation, unspecified constipation type; Colitis      polyethylene glycol (GOLYTELY) 236 g suspension 6 pm drink an 8-ounce glass every 15 minutes until the jug is half empty. Drink the other half of the Golytely jug at 11 PM  Qty: 4000 mL, Refills: 0    Associated Diagnoses: Constipation, unspecified constipation type         CONTINUE these medications which have CHANGED    Details   polyethylene glycol (MIRALAX) 17 GM/Dose powder Take 17 g by mouth daily  Qty: 510 g, Refills: 0    Associated Diagnoses: Constipation, unspecified constipation type         CONTINUE these medications which have NOT CHANGED    Details   acyclovir (ZOVIRAX) 400 MG tablet [ACYCLOVIR (ZOVIRAX) 400 MG TABLET] TAKE 1 TABLET BY MOUTH THREE TIMES DAILY AS NEEDED  Refills: 2      ALPRAZolam (XANAX) 0.5 MG tablet Take 0.5 mg by mouth nightly as needed  Refills: 5      buPROPion (WELLBUTRIN XL) 300 MG 24 hr tablet [BUPROPION (WELLBUTRIN XL) 300 MG 24 HR TABLET] Take 300 mg by mouth daily.       dextroamphetamine-amphetamine (ADDERALL XR) 30 MG 24 hr capsule [DEXTROAMPHETAMINE-AMPHETAMINE (ADDERALL XR) 30 MG 24 HR CAPSULE] Take 30 mg by mouth every morning.      FLUoxetine (PROZAC) 40 MG capsule Take 40 mg by mouth daily      omeprazole (PRILOSEC) 20 MG DR capsule Take 20 mg by mouth daily      ondansetron (ZOFRAN ODT) 8 MG ODT tab Take 8 mg by mouth 3 times daily as needed for nausea           Allergies   Allergies   Allergen Reactions     Penicillins Itching

## 2023-06-13 NOTE — CONSULTS
"ACUPUNCTURIST TREATMENT NOTE    Name: Yumi Cutler  :  1970  MRN:  [id number]    Acupuncture Treatment  Patient Type: Medical  Intervention Reason: Anxiety/Stress  Gi Support Specify:: Constipation  Pain Location: Abdomen  Pre-session Pain Rating: 3  Post-session Pain Ratin  Pre-session Stress/Anxiety ratin  Post-session Stress/Anxiety ratin  Patient complaint:: Anxiety  Initial insertions: (L) Short mallory, Yin mayer, Ht 7, Sp 6, Ashley 3  Number of needles inserted: 8  Number of needles removed: 8         \"Risks and benefits of acupuncture were discussed with patient. Consent for treatment was given. We thank you for the referral.\"     Jacobo Moore    Date:  2023  Time:  11:16 AM    "

## 2023-06-14 ENCOUNTER — PATIENT OUTREACH (OUTPATIENT)
Dept: CARE COORDINATION | Facility: CLINIC | Age: 53
End: 2023-06-14
Payer: COMMERCIAL

## 2023-06-14 LAB
TTG IGA SER-ACNC: <0.2 U/ML
TTG IGG SER-ACNC: <0.6 U/ML

## 2023-06-14 NOTE — PROGRESS NOTES
[facility]: Post-Discharge Note  SITUATION                                                      Admission:    Admission Date: 06/10/23   Reason for Admission: 14 days of constipation and abdominal pain  Discharge:   Discharge Date: 06/13/23  Discharge Diagnosis: Severe Constipation  Colitis    BACKGROUND                                                      Per hospital discharge summary and inpatient provider notes:  Yumi Cutler was admitted on 6/10/2023 for 14 days of constipation and abdominal pain.  The following problems were addressed during her hospitalization:    ASSESSMENT        Discharge Assessment  How are you doing now that you are home?: Feelng good.  How are your symptoms? (Red Flag symptoms escalate to triage hotline per guidelines): Improved  Do you feel your condition is stable enough to be safe at home until your provider visit?: Yes  Does the patient have their discharge instructions? : Yes  Does the patient have questions regarding their discharge instructions? : No  Were you started on any new medications or were there changes to any of your previous medications? : Yes  Does the patient have all of their medications?: Yes  Do you have questions regarding any of your medications? : No  Do you have all of your needed medical supplies or equipment (DME)?  (i.e. oxygen tank, CPAP, cane, etc.): Yes  Discharge follow-up appointment scheduled within 14 calendar days? : No (Patient said that she was told that GI would call her for f/u appt.)  Is patient agreeable to assistance with scheduling? : No    Post-op ([facility] Only)  If the patient had a surgery or procedure, do they have any questions for a nurse?: No      PLAN                                                      Outpatient Plan:  Recommend following-up with [facility] per their recommendations. [facility] will help facilitate outpatient colonoscopy. Follow up with primary care provider, French Leong, within 7 days for hospital follow-  up.    No future appointments.      For any urgent concerns, please contact our 24 hour nurse triage line: [phone] ([phone])         NICOLAS Corea  [phone]  [facility]

## 2023-06-24 ENCOUNTER — HEALTH MAINTENANCE LETTER (OUTPATIENT)
Age: 53
End: 2023-06-24

## 2024-08-11 ENCOUNTER — HEALTH MAINTENANCE LETTER (OUTPATIENT)
Age: 54
End: 2024-08-11

## 2025-08-17 ENCOUNTER — HEALTH MAINTENANCE LETTER (OUTPATIENT)
Age: 55
End: 2025-08-17